# Patient Record
Sex: MALE | Race: WHITE | NOT HISPANIC OR LATINO | Employment: FULL TIME | URBAN - METROPOLITAN AREA
[De-identification: names, ages, dates, MRNs, and addresses within clinical notes are randomized per-mention and may not be internally consistent; named-entity substitution may affect disease eponyms.]

---

## 2017-01-17 ENCOUNTER — OFFICE VISIT - HEALTHEAST (OUTPATIENT)
Dept: FAMILY MEDICINE | Facility: CLINIC | Age: 37
End: 2017-01-17

## 2017-01-17 DIAGNOSIS — M25.561 RIGHT KNEE PAIN: ICD-10-CM

## 2017-01-17 DIAGNOSIS — M54.12 CERVICAL RADICULOPATHY AT C6: ICD-10-CM

## 2017-01-20 ENCOUNTER — OFFICE VISIT - HEALTHEAST (OUTPATIENT)
Dept: PHYSICAL THERAPY | Facility: REHABILITATION | Age: 37
End: 2017-01-20

## 2017-01-20 DIAGNOSIS — M47.812 CERVICAL FACET SYNDROME: ICD-10-CM

## 2017-01-20 DIAGNOSIS — M54.12 CERVICAL RADICULOPATHY: ICD-10-CM

## 2017-04-05 ENCOUNTER — OFFICE VISIT - HEALTHEAST (OUTPATIENT)
Dept: FAMILY MEDICINE | Facility: CLINIC | Age: 37
End: 2017-04-05

## 2017-04-05 DIAGNOSIS — M25.472 ANKLE SWELLING, LEFT: ICD-10-CM

## 2018-04-06 ENCOUNTER — RECORDS - HEALTHEAST (OUTPATIENT)
Dept: LAB | Facility: CLINIC | Age: 38
End: 2018-04-06

## 2018-04-06 LAB
ANION GAP SERPL CALCULATED.3IONS-SCNC: 11 MMOL/L (ref 5–18)
BUN SERPL-MCNC: 12 MG/DL (ref 8–22)
CALCIUM SERPL-MCNC: 10.1 MG/DL (ref 8.5–10.5)
CHLORIDE BLD-SCNC: 104 MMOL/L (ref 98–107)
CO2 SERPL-SCNC: 26 MMOL/L (ref 22–31)
CREAT SERPL-MCNC: 0.96 MG/DL (ref 0.7–1.3)
GFR SERPL CREATININE-BSD FRML MDRD: >60 ML/MIN/1.73M2
GLUCOSE BLD-MCNC: 81 MG/DL (ref 70–125)
POTASSIUM BLD-SCNC: 4.4 MMOL/L (ref 3.5–5)
SODIUM SERPL-SCNC: 141 MMOL/L (ref 136–145)
URATE SERPL-MCNC: 9.1 MG/DL (ref 3–8)

## 2018-08-22 ENCOUNTER — OFFICE VISIT - HEALTHEAST (OUTPATIENT)
Dept: FAMILY MEDICINE | Facility: CLINIC | Age: 38
End: 2018-08-22

## 2018-08-22 ENCOUNTER — COMMUNICATION - HEALTHEAST (OUTPATIENT)
Dept: FAMILY MEDICINE | Facility: CLINIC | Age: 38
End: 2018-08-22

## 2018-08-22 DIAGNOSIS — S60.512A HAND ABRASION, LEFT, INITIAL ENCOUNTER: ICD-10-CM

## 2018-08-22 ASSESSMENT — MIFFLIN-ST. JEOR: SCORE: 1733.19

## 2019-07-17 ENCOUNTER — OFFICE VISIT - HEALTHEAST (OUTPATIENT)
Dept: FAMILY MEDICINE | Facility: CLINIC | Age: 39
End: 2019-07-17

## 2019-07-17 DIAGNOSIS — M25.473 ANKLE SWELLING, UNSPECIFIED LATERALITY: ICD-10-CM

## 2019-07-17 DIAGNOSIS — L55.9 SUNBURN: ICD-10-CM

## 2019-07-17 ASSESSMENT — MIFFLIN-ST. JEOR: SCORE: 1726.84

## 2019-07-25 ENCOUNTER — COMMUNICATION - HEALTHEAST (OUTPATIENT)
Dept: SCHEDULING | Facility: CLINIC | Age: 39
End: 2019-07-25

## 2019-07-25 ENCOUNTER — OFFICE VISIT - HEALTHEAST (OUTPATIENT)
Dept: FAMILY MEDICINE | Facility: CLINIC | Age: 39
End: 2019-07-25

## 2019-07-25 DIAGNOSIS — R21 RASH: ICD-10-CM

## 2019-11-03 ENCOUNTER — HEALTH MAINTENANCE LETTER (OUTPATIENT)
Age: 39
End: 2019-11-03

## 2020-04-14 ENCOUNTER — COMMUNICATION - HEALTHEAST (OUTPATIENT)
Dept: FAMILY MEDICINE | Facility: CLINIC | Age: 40
End: 2020-04-14

## 2020-04-14 ENCOUNTER — OFFICE VISIT - HEALTHEAST (OUTPATIENT)
Dept: FAMILY MEDICINE | Facility: CLINIC | Age: 40
End: 2020-04-14

## 2020-04-14 DIAGNOSIS — Z71.1 WORRIED WELL: ICD-10-CM

## 2020-11-16 ENCOUNTER — HEALTH MAINTENANCE LETTER (OUTPATIENT)
Age: 40
End: 2020-11-16

## 2021-01-25 ENCOUNTER — OFFICE VISIT - HEALTHEAST (OUTPATIENT)
Dept: FAMILY MEDICINE | Facility: CLINIC | Age: 41
End: 2021-01-25

## 2021-01-25 DIAGNOSIS — Z83.3 FAMILY HISTORY OF DIABETES MELLITUS: ICD-10-CM

## 2021-01-25 DIAGNOSIS — M10.071 ACUTE IDIOPATHIC GOUT OF RIGHT FOOT: ICD-10-CM

## 2021-01-25 LAB
ALBUMIN SERPL-MCNC: 4.6 G/DL (ref 3.5–5)
ALP SERPL-CCNC: 121 U/L (ref 45–120)
ALT SERPL W P-5'-P-CCNC: 48 U/L (ref 0–45)
ANION GAP SERPL CALCULATED.3IONS-SCNC: 12 MMOL/L (ref 5–18)
AST SERPL W P-5'-P-CCNC: 27 U/L (ref 0–40)
BASOPHILS # BLD AUTO: 0 THOU/UL (ref 0–0.2)
BASOPHILS NFR BLD AUTO: 0 % (ref 0–2)
BILIRUB SERPL-MCNC: 0.6 MG/DL (ref 0–1)
BUN SERPL-MCNC: 15 MG/DL (ref 8–22)
CALCIUM SERPL-MCNC: 9.9 MG/DL (ref 8.5–10.5)
CHLORIDE BLD-SCNC: 103 MMOL/L (ref 98–107)
CHOLEST SERPL-MCNC: 225 MG/DL
CO2 SERPL-SCNC: 23 MMOL/L (ref 22–31)
CREAT SERPL-MCNC: 1.08 MG/DL (ref 0.7–1.3)
EOSINOPHIL # BLD AUTO: 0.3 THOU/UL (ref 0–0.4)
EOSINOPHIL NFR BLD AUTO: 2 % (ref 0–6)
ERYTHROCYTE [DISTWIDTH] IN BLOOD BY AUTOMATED COUNT: 12.4 % (ref 11–14.5)
FASTING STATUS PATIENT QL REPORTED: NO
GFR SERPL CREATININE-BSD FRML MDRD: >60 ML/MIN/1.73M2
GLUCOSE BLD-MCNC: 80 MG/DL (ref 70–125)
HBA1C MFR BLD: 5 %
HCT VFR BLD AUTO: 53.7 % (ref 40–54)
HDLC SERPL-MCNC: 27 MG/DL
HGB BLD-MCNC: 19.3 G/DL (ref 14–18)
IMM GRANULOCYTES # BLD: 0.1 THOU/UL
IMM GRANULOCYTES NFR BLD: 1 %
LDLC SERPL CALC-MCNC: 97 MG/DL
LDLC SERPL CALC-MCNC: ABNORMAL MG/DL
LYMPHOCYTES # BLD AUTO: 2 THOU/UL (ref 0.8–4.4)
LYMPHOCYTES NFR BLD AUTO: 16 % (ref 20–40)
MCH RBC QN AUTO: 30.8 PG (ref 27–34)
MCHC RBC AUTO-ENTMCNC: 35.9 G/DL (ref 32–36)
MCV RBC AUTO: 86 FL (ref 80–100)
MONOCYTES # BLD AUTO: 1 THOU/UL (ref 0–0.9)
MONOCYTES NFR BLD AUTO: 8 % (ref 2–10)
NEUTROPHILS # BLD AUTO: 9.3 THOU/UL (ref 2–7.7)
NEUTROPHILS NFR BLD AUTO: 73 % (ref 50–70)
PLATELET # BLD AUTO: 306 THOU/UL (ref 140–440)
PMV BLD AUTO: 9.7 FL (ref 8.5–12.5)
POTASSIUM BLD-SCNC: 4.6 MMOL/L (ref 3.5–5)
PROT SERPL-MCNC: 8.1 G/DL (ref 6–8)
RBC # BLD AUTO: 6.26 MILL/UL (ref 4.4–6.2)
SODIUM SERPL-SCNC: 138 MMOL/L (ref 136–145)
TRIGL SERPL-MCNC: 1002 MG/DL
URATE SERPL-MCNC: 8.4 MG/DL (ref 3–8)
WBC: 12.7 THOU/UL (ref 4–11)

## 2021-01-25 ASSESSMENT — MIFFLIN-ST. JEOR: SCORE: 1717.09

## 2021-01-26 ENCOUNTER — COMMUNICATION - HEALTHEAST (OUTPATIENT)
Dept: FAMILY MEDICINE | Facility: CLINIC | Age: 41
End: 2021-01-26

## 2021-01-26 ENCOUNTER — AMBULATORY - HEALTHEAST (OUTPATIENT)
Dept: FAMILY MEDICINE | Facility: CLINIC | Age: 41
End: 2021-01-26

## 2021-01-26 ENCOUNTER — COMMUNICATION - HEALTHEAST (OUTPATIENT)
Dept: ADMINISTRATIVE | Facility: CLINIC | Age: 41
End: 2021-01-26

## 2021-01-26 DIAGNOSIS — M10.071 ACUTE IDIOPATHIC GOUT OF RIGHT FOOT: ICD-10-CM

## 2021-02-04 ENCOUNTER — OFFICE VISIT - HEALTHEAST (OUTPATIENT)
Dept: FAMILY MEDICINE | Facility: CLINIC | Age: 41
End: 2021-02-04

## 2021-02-04 DIAGNOSIS — M1A.0610 IDIOPATHIC CHRONIC GOUT OF RIGHT KNEE WITHOUT TOPHUS: ICD-10-CM

## 2021-02-04 DIAGNOSIS — M10.071 ACUTE IDIOPATHIC GOUT OF RIGHT FOOT: ICD-10-CM

## 2021-02-04 LAB
ERYTHROCYTE [SEDIMENTATION RATE] IN BLOOD BY WESTERGREN METHOD: 8 MM/HR (ref 0–15)
URATE SERPL-MCNC: 9.8 MG/DL (ref 3–8)

## 2021-02-05 ENCOUNTER — AMBULATORY - HEALTHEAST (OUTPATIENT)
Dept: FAMILY MEDICINE | Facility: CLINIC | Age: 41
End: 2021-02-05

## 2021-02-05 ENCOUNTER — COMMUNICATION - HEALTHEAST (OUTPATIENT)
Dept: FAMILY MEDICINE | Facility: CLINIC | Age: 41
End: 2021-02-05

## 2021-02-05 DIAGNOSIS — M1A.0610 IDIOPATHIC CHRONIC GOUT OF RIGHT KNEE WITHOUT TOPHUS: ICD-10-CM

## 2021-02-05 LAB
ANA SER QL: 0.1 U
B BURGDOR IGG+IGM SER QL: 0.05 INDEX VALUE

## 2021-03-15 ENCOUNTER — COMMUNICATION - HEALTHEAST (OUTPATIENT)
Dept: FAMILY MEDICINE | Facility: CLINIC | Age: 41
End: 2021-03-15

## 2021-03-15 ENCOUNTER — OFFICE VISIT - HEALTHEAST (OUTPATIENT)
Dept: FAMILY MEDICINE | Facility: CLINIC | Age: 41
End: 2021-03-15

## 2021-03-15 DIAGNOSIS — M1A.0610 IDIOPATHIC CHRONIC GOUT OF RIGHT KNEE WITHOUT TOPHUS: ICD-10-CM

## 2021-03-15 DIAGNOSIS — G89.29 CHRONIC PAIN OF RIGHT KNEE: ICD-10-CM

## 2021-03-15 DIAGNOSIS — M25.561 CHRONIC PAIN OF RIGHT KNEE: ICD-10-CM

## 2021-03-15 LAB
ALBUMIN SERPL-MCNC: 4.2 G/DL (ref 3.5–5)
ALP SERPL-CCNC: 91 U/L (ref 45–120)
ALT SERPL W P-5'-P-CCNC: 31 U/L (ref 0–45)
ANION GAP SERPL CALCULATED.3IONS-SCNC: 12 MMOL/L (ref 5–18)
AST SERPL W P-5'-P-CCNC: 20 U/L (ref 0–40)
BASOPHILS # BLD AUTO: 0 THOU/UL (ref 0–0.2)
BASOPHILS NFR BLD AUTO: 1 % (ref 0–2)
BILIRUB SERPL-MCNC: 0.4 MG/DL (ref 0–1)
BUN SERPL-MCNC: 13 MG/DL (ref 8–22)
CALCIUM SERPL-MCNC: 9.5 MG/DL (ref 8.5–10.5)
CHLORIDE BLD-SCNC: 110 MMOL/L (ref 98–107)
CO2 SERPL-SCNC: 24 MMOL/L (ref 22–31)
CREAT SERPL-MCNC: 1.01 MG/DL (ref 0.7–1.3)
EOSINOPHIL # BLD AUTO: 0.6 THOU/UL (ref 0–0.4)
EOSINOPHIL NFR BLD AUTO: 7 % (ref 0–6)
ERYTHROCYTE [DISTWIDTH] IN BLOOD BY AUTOMATED COUNT: 12.9 % (ref 11–14.5)
GFR SERPL CREATININE-BSD FRML MDRD: >60 ML/MIN/1.73M2
GLUCOSE BLD-MCNC: 95 MG/DL (ref 70–125)
HCT VFR BLD AUTO: 49.4 % (ref 40–54)
HGB BLD-MCNC: 17.7 G/DL (ref 14–18)
IMM GRANULOCYTES # BLD: 0 THOU/UL
IMM GRANULOCYTES NFR BLD: 0 %
LYMPHOCYTES # BLD AUTO: 1.8 THOU/UL (ref 0.8–4.4)
LYMPHOCYTES NFR BLD AUTO: 22 % (ref 20–40)
MCH RBC QN AUTO: 30.5 PG (ref 27–34)
MCHC RBC AUTO-ENTMCNC: 35.8 G/DL (ref 32–36)
MCV RBC AUTO: 85 FL (ref 80–100)
MONOCYTES # BLD AUTO: 0.7 THOU/UL (ref 0–0.9)
MONOCYTES NFR BLD AUTO: 8 % (ref 2–10)
NEUTROPHILS # BLD AUTO: 4.9 THOU/UL (ref 2–7.7)
NEUTROPHILS NFR BLD AUTO: 61 % (ref 50–70)
PLATELET # BLD AUTO: 267 THOU/UL (ref 140–440)
PMV BLD AUTO: 9.6 FL (ref 7–10)
POTASSIUM BLD-SCNC: 4.6 MMOL/L (ref 3.5–5)
PROT SERPL-MCNC: 7.2 G/DL (ref 6–8)
RBC # BLD AUTO: 5.8 MILL/UL (ref 4.4–6.2)
SODIUM SERPL-SCNC: 146 MMOL/L (ref 136–145)
URATE SERPL-MCNC: 5.4 MG/DL (ref 3–8)
WBC: 8 THOU/UL (ref 4–11)

## 2021-03-15 ASSESSMENT — PATIENT HEALTH QUESTIONNAIRE - PHQ9: SUM OF ALL RESPONSES TO PHQ QUESTIONS 1-9: 0

## 2021-04-20 ENCOUNTER — OFFICE VISIT (OUTPATIENT)
Dept: DERMATOLOGY | Facility: CLINIC | Age: 41
End: 2021-04-20
Payer: COMMERCIAL

## 2021-04-20 ENCOUNTER — TELEPHONE (OUTPATIENT)
Dept: DERMATOLOGY | Facility: CLINIC | Age: 41
End: 2021-04-20

## 2021-04-20 VITALS — HEART RATE: 96 BPM | SYSTOLIC BLOOD PRESSURE: 143 MMHG | OXYGEN SATURATION: 96 % | DIASTOLIC BLOOD PRESSURE: 102 MMHG

## 2021-04-20 DIAGNOSIS — L40.9 PSORIASIS OF NAIL: Primary | ICD-10-CM

## 2021-04-20 LAB
ALBUMIN SERPL-MCNC: 4.2 G/DL (ref 3.4–5)
ALP SERPL-CCNC: 96 U/L (ref 40–150)
ALT SERPL W P-5'-P-CCNC: 48 U/L (ref 0–70)
ANION GAP SERPL CALCULATED.3IONS-SCNC: 3 MMOL/L (ref 3–14)
AST SERPL W P-5'-P-CCNC: 28 U/L (ref 0–45)
BILIRUB SERPL-MCNC: 0.4 MG/DL (ref 0.2–1.3)
BUN SERPL-MCNC: 14 MG/DL (ref 7–30)
CALCIUM SERPL-MCNC: 9.4 MG/DL (ref 8.5–10.1)
CHLORIDE SERPL-SCNC: 108 MMOL/L (ref 94–109)
CO2 SERPL-SCNC: 29 MMOL/L (ref 20–32)
CREAT SERPL-MCNC: 1.09 MG/DL (ref 0.66–1.25)
ERYTHROCYTE [DISTWIDTH] IN BLOOD BY AUTOMATED COUNT: 13.8 % (ref 10–15)
GFR SERPL CREATININE-BSD FRML MDRD: 85 ML/MIN/{1.73_M2}
GLUCOSE SERPL-MCNC: 93 MG/DL (ref 70–99)
HCT VFR BLD AUTO: 53.5 % (ref 40–53)
HGB BLD-MCNC: 18.5 G/DL (ref 13.3–17.7)
MCH RBC QN AUTO: 30.8 PG (ref 26.5–33)
MCHC RBC AUTO-ENTMCNC: 34.6 G/DL (ref 31.5–36.5)
MCV RBC AUTO: 89 FL (ref 78–100)
PLATELET # BLD AUTO: 227 10E9/L (ref 150–450)
POTASSIUM SERPL-SCNC: 4.4 MMOL/L (ref 3.4–5.3)
PROT SERPL-MCNC: 7.9 G/DL (ref 6.8–8.8)
RBC # BLD AUTO: 6.01 10E12/L (ref 4.4–5.9)
SODIUM SERPL-SCNC: 140 MMOL/L (ref 133–144)
WBC # BLD AUTO: 7.8 10E9/L (ref 4–11)

## 2021-04-20 PROCEDURE — 99203 OFFICE O/P NEW LOW 30 MIN: CPT | Performed by: DERMATOLOGY

## 2021-04-20 PROCEDURE — 80053 COMPREHEN METABOLIC PANEL: CPT | Performed by: DERMATOLOGY

## 2021-04-20 PROCEDURE — 85027 COMPLETE CBC AUTOMATED: CPT | Performed by: DERMATOLOGY

## 2021-04-20 PROCEDURE — 86481 TB AG RESPONSE T-CELL SUSP: CPT | Performed by: DERMATOLOGY

## 2021-04-20 PROCEDURE — 86803 HEPATITIS C AB TEST: CPT | Performed by: DERMATOLOGY

## 2021-04-20 PROCEDURE — 86704 HEP B CORE ANTIBODY TOTAL: CPT | Performed by: DERMATOLOGY

## 2021-04-20 PROCEDURE — 36415 COLL VENOUS BLD VENIPUNCTURE: CPT | Performed by: DERMATOLOGY

## 2021-04-20 RX ORDER — ALLOPURINOL 300 MG/1
300 TABLET ORAL DAILY
COMMUNITY
Start: 2021-02-04 | End: 2022-03-03

## 2021-04-20 NOTE — PROGRESS NOTES
Martell Clement is an extremely pleasant 41 year old year old male patient here today for nail psoriasis.   .   Patient states this has been present for a while.  Patient reports the following symptoms:  Nail changes and finger pain.  Dad has psoriasis.  .  Patient reports the following previous treatments NBVUB helped some.  Patient reports the following modifying factors none.  Associated symptoms: none.  Patient has no other skin complaints today.  Remainder of the HPI, Meds, PMH, Allergies, FH, and SH was reviewed in chart.    No past medical history on file.    No past surgical history on file.     Family History   Problem Relation Age of Onset     Diabetes Father      Heart Disease Father      Diabetes Paternal Grandmother      Diabetes Paternal Grandfather        Social History     Socioeconomic History     Marital status: Single     Spouse name: Not on file     Number of children: Not on file     Years of education: Not on file     Highest education level: Not on file   Occupational History     Not on file   Social Needs     Financial resource strain: Not on file     Food insecurity     Worry: Not on file     Inability: Not on file     Transportation needs     Medical: Not on file     Non-medical: Not on file   Tobacco Use     Smoking status: Current Every Day Smoker     Smokeless tobacco: Never Used   Substance and Sexual Activity     Alcohol use: No     Drug use: No     Sexual activity: Not Currently     Partners: Female   Lifestyle     Physical activity     Days per week: Not on file     Minutes per session: Not on file     Stress: Not on file   Relationships     Social connections     Talks on phone: Not on file     Gets together: Not on file     Attends [employer] service: Not on file     Active member of club or organization: Not on file     Attends meetings of clubs or organizations: Not on file     Relationship status: Not on file     Intimate partner violence     Fear of current or ex partner: Not on  file     Emotionally abused: Not on file     Physically abused: Not on file     Forced sexual activity: Not on file   Other Topics Concern     Parent/sibling w/ CABG, MI or angioplasty before 65F 55M? Yes     Comment: father late 40's - stent    Social History Narrative     Not on file       Outpatient Encounter Medications as of 4/20/2021   Medication Sig Dispense Refill     allopurinol (ZYLOPRIM) 300 MG tablet Take 300 mg by mouth daily       cholecalciferol 25 MCG (1000 UT) TABS Take 1,000 Units by mouth daily       Omega-3 Fatty Acids (FISH OIL PO)        No facility-administered encounter medications on file as of 4/20/2021.              Review Of Systems  Skin: As above  Eyes: negative  Ears/Nose/Throat: negative  Respiratory: No shortness of breath, dyspnea on exertion, cough, or hemoptysis  Cardiovascular: negative  Gastrointestinal: negative  Genitourinary: negative  Musculoskeletal: negative  Neurologic: negative  Psychiatric: negative  Hematologic/Lymphatic/Immunologic: negative  Endocrine: negative      O:   NAD, WDWN, Alert & Oriented, Mood & Affect wnl, Vitals stable   Here today alone   BP (!) 143/102 (BP Location: Left arm, Patient Position: Sitting, Cuff Size: Adult Large)   Pulse 96   SpO2 96%    General appearance normal   Vitals stable   Alert, oriented and in no acute distress     Pitting and onhcolysis of all fingernails     The remainder of expanded problem focused exam was normal; the following areas were examined:  scalp/hair, conjunctiva/lids, face, neck,       Eyes: Conjunctivae/lids:Normal     ENT: Lips, buccal mucosa, tongue: normal    MSK:Normal    Cardiovascular: peripheral edema none    Pulm: Breathing Normal    Neuro/Psych: Orientation:Alert and Orientedx3 ; Mood/Affect:normal       A/P:  1. Nail psoriasis  methotrexaten and biolgics discussed with patient   He will think about  Check cbc, cmp, tb, hep b and c and he will mychart us on whathe wants to do  It was a pleasure speaking  to Martell Clement today.  Previous clinic  notes and pertinent laboratory tests were reviewed prior to Martell Clement's visit.

## 2021-04-20 NOTE — LETTER
4/20/2021         RE: Martell Clement  [address] [address] [facility] [address]        Dear Colleague,    Thank you for referring your patient, Martell Clement, to the [facility]. Please see a copy of my visit note below.    Martell Clement is an extremely pleasant 41 year old year old male patient here today for nail psoriasis.   .   Patient states this has been present for a while.  Patient reports the following symptoms:  Nail changes and finger pain.  Dad has psoriasis.  .  Patient reports the following previous treatments NBVUB helped some.  Patient reports the following modifying factors none.  Associated symptoms: none.  Patient has no other skin complaints today.  Remainder of the HPI, Meds, PMH, Allergies, FH, and SH was reviewed in chart.    No past medical history on file.    No past surgical history on file.     Family History   Problem Relation Age of Onset     Diabetes Father      Heart Disease Father      Diabetes Paternal Grandmother      Diabetes Paternal Grandfather        Social History     Socioeconomic History     Marital status: Single     Spouse name: Not on file     Number of children: Not on file     Years of education: Not on file     Highest education level: Not on file   Occupational History     Not on file   Social Needs     Financial resource strain: Not on file     Food insecurity     Worry: Not on file     Inability: Not on file     Transportation needs     Medical: Not on file     Non-medical: Not on file   Tobacco Use     Smoking status: Current Every Day Smoker     Smokeless tobacco: Never Used   Substance and Sexual Activity     Alcohol use: No     Drug use: No     Sexual activity: Not Currently     Partners: Female   Lifestyle     Physical activity     Days per week: Not on file     Minutes per session: Not on file     Stress: Not on file   Relationships     Social connections     Talks on phone: Not on file     Gets together: Not on file     Attends  [employer] service: Not on file     Active member of club or organization: Not on file     Attends meetings of clubs or organizations: Not on file     Relationship status: Not on file     Intimate partner violence     Fear of current or ex partner: Not on file     Emotionally abused: Not on file     Physically abused: Not on file     Forced sexual activity: Not on file   Other Topics Concern     Parent/sibling w/ CABG, MI or angioplasty before 65F 55M? Yes     Comment: father late 40's - stent    Social History Narrative     Not on file       Outpatient Encounter Medications as of 4/20/2021   Medication Sig Dispense Refill     allopurinol (ZYLOPRIM) 300 MG tablet Take 300 mg by mouth daily       cholecalciferol 25 MCG (1000 UT) TABS Take 1,000 Units by mouth daily       Omega-3 Fatty Acids (FISH OIL PO)        No facility-administered encounter medications on file as of 4/20/2021.              Review Of Systems  Skin: As above  Eyes: negative  Ears/Nose/Throat: negative  Respiratory: No shortness of breath, dyspnea on exertion, cough, or hemoptysis  Cardiovascular: negative  Gastrointestinal: negative  Genitourinary: negative  Musculoskeletal: negative  Neurologic: negative  Psychiatric: negative  Hematologic/Lymphatic/Immunologic: negative  Endocrine: negative      O:   NAD, WDWN, Alert & Oriented, Mood & Affect wnl, Vitals stable   Here today alone   BP (!) 143/102 (BP Location: Left arm, Patient Position: Sitting, Cuff Size: Adult Large)   Pulse 96   SpO2 96%    General appearance normal   Vitals stable   Alert, oriented and in no acute distress     Pitting and onhcolysis of all fingernails     The remainder of expanded problem focused exam was normal; the following areas were examined:  scalp/hair, conjunctiva/lids, face, neck,       Eyes: Conjunctivae/lids:Normal     ENT: Lips, buccal mucosa, tongue: normal    MSK:Normal    Cardiovascular: peripheral edema none    Pulm: Breathing Normal    Neuro/Psych:  Orientation:Alert and Orientedx3 ; Mood/Affect:normal       A/P:  1. Nail psoriasis  methotrexaten and biolgics discussed with patient   He will think about  Check cbc, cmp, tb, hep b and c and he will mychart us on whathe wants to do  It was a pleasure speaking to Martell Clement today.  Previous clinic  notes and pertinent laboratory tests were reviewed prior to Martell Clement's visit.        Again, thank you for allowing me to participate in the care of your patient.        Sincerely,        Mikhail Steele MD

## 2021-04-20 NOTE — TELEPHONE ENCOUNTER
Reason for Call:  Other call back    Detailed comments: Pt states he had labs drawn at a different clinic recently -wants to make sure that he is not going to be duplicating labs.    Phone Number Patient can be reached at: Home number on file [phone] (home)    Best Time:     Can we leave a detailed message on this number? YES    Call taken on 4/20/2021 at 3:47 PM by Hortensia Lomeli

## 2021-04-20 NOTE — TELEPHONE ENCOUNTER
Spoke to patient who had the following drawn:     CBC and diff  CMP  Erthy Sed rate  KELLY  Lyme  Uric acid  HGb A1 c  Lipids/LDL  Troponin  D-Dimer  Lipase  CBC and ECG 12 lead    I did advise that the CBC and CMP were duplicates but since the blood has already been drawn so it is likely that too late, and he verbalized understanding.     I did advise in the future, he could certainly have drawing lab fax results to us and then he would not need to duplicate lab draw. Patient verbalized understanding. Latrice Saini RN

## 2021-04-20 NOTE — NURSING NOTE
Chief Complaint   Patient presents with     Eczema     nail       Vitals:    04/20/21 1436   BP: (!) 143/102   BP Location: Left arm   Patient Position: Sitting   Cuff Size: Adult Large   Pulse: 96   SpO2: 96%     Wt Readings from Last 1 Encounters:   11/22/13 83.9 kg (185 lb)       Juani Becker LPN .................4/20/2021

## 2021-04-21 LAB
HBV CORE AB SERPL QL IA: NONREACTIVE
HCV AB SERPL QL IA: NONREACTIVE

## 2021-04-22 LAB
GAMMA INTERFERON BACKGROUND BLD IA-ACNC: 0.02 IU/ML
M TB IFN-G CD4+ BCKGRND COR BLD-ACNC: 9.98 IU/ML
M TB TUBERC IFN-G BLD QL: NEGATIVE
MITOGEN IGNF BCKGRD COR BLD-ACNC: 0 IU/ML
MITOGEN IGNF BCKGRD COR BLD-ACNC: 0 IU/ML

## 2021-05-14 ENCOUNTER — COMMUNICATION - HEALTHEAST (OUTPATIENT)
Dept: ADMINISTRATIVE | Facility: CLINIC | Age: 41
End: 2021-05-14

## 2021-05-14 DIAGNOSIS — M1A.0610 IDIOPATHIC CHRONIC GOUT OF RIGHT KNEE WITHOUT TOPHUS: ICD-10-CM

## 2021-05-17 ENCOUNTER — OFFICE VISIT - HEALTHEAST (OUTPATIENT)
Dept: FAMILY MEDICINE | Facility: CLINIC | Age: 41
End: 2021-05-17

## 2021-05-17 DIAGNOSIS — M1A.0610 IDIOPATHIC CHRONIC GOUT OF RIGHT KNEE WITHOUT TOPHUS: ICD-10-CM

## 2021-05-17 LAB
ALBUMIN SERPL-MCNC: 4.6 G/DL (ref 3.5–5)
ALP SERPL-CCNC: 93 U/L (ref 45–120)
ALT SERPL W P-5'-P-CCNC: 47 U/L (ref 0–45)
ANION GAP SERPL CALCULATED.3IONS-SCNC: 15 MMOL/L (ref 5–18)
AST SERPL W P-5'-P-CCNC: 32 U/L (ref 0–40)
BILIRUB SERPL-MCNC: 0.6 MG/DL (ref 0–1)
BUN SERPL-MCNC: 18 MG/DL (ref 8–22)
CALCIUM SERPL-MCNC: 10.2 MG/DL (ref 8.5–10.5)
CHLORIDE BLD-SCNC: 104 MMOL/L (ref 98–107)
CO2 SERPL-SCNC: 24 MMOL/L (ref 22–31)
CREAT SERPL-MCNC: 1.2 MG/DL (ref 0.7–1.3)
GFR SERPL CREATININE-BSD FRML MDRD: >60 ML/MIN/1.73M2
GLUCOSE BLD-MCNC: 88 MG/DL (ref 70–125)
POTASSIUM BLD-SCNC: 5.1 MMOL/L (ref 3.5–5)
PROT SERPL-MCNC: 7.5 G/DL (ref 6–8)
SODIUM SERPL-SCNC: 143 MMOL/L (ref 136–145)
URATE SERPL-MCNC: 5.7 MG/DL (ref 3–8)

## 2021-05-19 ENCOUNTER — RECORDS - HEALTHEAST (OUTPATIENT)
Dept: ADMINISTRATIVE | Facility: OTHER | Age: 41
End: 2021-05-19

## 2021-05-27 VITALS
DIASTOLIC BLOOD PRESSURE: 88 MMHG | OXYGEN SATURATION: 97 % | WEIGHT: 193.8 LBS | BODY MASS INDEX: 30.13 KG/M2 | SYSTOLIC BLOOD PRESSURE: 118 MMHG | HEART RATE: 87 BPM

## 2021-05-27 ASSESSMENT — PATIENT HEALTH QUESTIONNAIRE - PHQ9: SUM OF ALL RESPONSES TO PHQ QUESTIONS 1-9: 0

## 2021-05-30 VITALS — BODY MASS INDEX: 27.52 KG/M2 | WEIGHT: 181 LBS

## 2021-05-30 VITALS — BODY MASS INDEX: 28.06 KG/M2 | WEIGHT: 184.56 LBS

## 2021-05-30 NOTE — PROGRESS NOTES
"  Chief Complaint   Patient presents with     Sunburn         HPI:   Martell Clement is a 39 y.o. male c/o sunburn on legs 2019.  Developed blisters on the area about a week later.  Yesterday had swelling in ankles by the end of the day, but much improved today.  He is wondering if this swelling might be from his gout.  Area is itchy.  No fevers.        ROS:  A 10 point comprehensive review of systems was negative except as noted.     Medications:  Current Outpatient Medications on File Prior to Visit   Medication Sig Dispense Refill     indomethacin (INDOCIN) 50 MG capsule Take 50 mg by mouth 2 (two) times a day with meals.       cholecalciferol, vitamin D3, (VITAMIN D3) 1,000 unit capsule Take 1,000 Units by mouth daily.       multivitamin capsule Take 1 capsule by mouth daily.       OMEGA-3/DHA/EPA/FISH OIL (FISH OIL-OMEGA-3 FATTY ACIDS) 300-1,000 mg capsule Take 2 g by mouth daily.       omeprazole (PRILOSEC) 20 MG capsule Take 1 capsule (20 mg total) by mouth daily before breakfast. 30 capsule 0     vitamin E 100 UNIT capsule Take 100 Units by mouth daily.       No current facility-administered medications on file prior to visit.          Social History:  Social History     Tobacco Use     Smoking status: Current Every Day Smoker     Last attempt to quit: 2016     Years since quittin.5     Smokeless tobacco: Never Used     Tobacco comment: used to smoke 1/2ppd   Substance Use Topics     Alcohol use: No     Comment: used to be heavy drinker and has stopped         Physical Exam:   Vitals:    19 1412   BP: 114/72   Patient Site: Right Arm   Patient Position: Sitting   Cuff Size: Adult Regular   Pulse: (!) 104   Resp: 20   Temp: 99.2  F (37.3  C)   TempSrc: Oral   Weight: 184 lb 9.6 oz (83.7 kg)   Height: 5' 8\" (1.727 m)       GEN: NAD  LEGS: bilateral erythema over lower legs with peeling noted   ANKLES:  Slight swelling        Assessment/Plan:    1. Sunburn     2. Ankle swelling, unspecified " laterality       Healing sunburn.  Swelling in ankles not gout, but likely due to the inflammation from the sunburn.  Advised good moisturizing of sunburned area.  Elevate legs frequently during the day.    Discussed gout--symptoms that he is having now are not gout.  He doesn't know that he has ever had joint aspiration with confirmation of gout, just appears to have been diagnosed clinically.  He is currently taking indomethacin for gout.  Originally it sounded to me like he was taking it daily to prevent gout flare ups, but he states he has taken it more frequently, although not daily, in the last month because he has had more frequent pain in his ankles that he thinks is gout pain.  Chart review shows an elevated uric acid.  He states he doesn't want to be on allopurinol because he doesn't want to take medication daily.  Follow up with his primary to further evaluate.          Elton Bellamy MD      7/17/2019    The following portions of the patient's history were reviewed and updated as appropriate: allergies, current medications, past family history, past medical history, past social history, past surgical history and problem list.

## 2021-05-30 NOTE — PROGRESS NOTES
Chief Complaint   Patient presents with     poss sunburn     [id number] Sunburn on the left/ right legs very painful possble be infection        HPI:  Martell Clement is a 39 y.o. male with past medical history of gout who presents today complaining of concern for possible infection from a sunburn that occurred 3 weeks ago.  Today patient developed purplish rash on his shins bilaterally.  He is otherwise feeling well and healthy without any fevers, chills, or pain in the legs.  He has a history of gout and had some lower extremity inflammation around his ankles earlier, so he was elevating his legs very frequently to decrease the fluid on his legs and taking indomethacin.    History obtained from the patient.    Problem List:  2015: Gout  2013: Compulsive tobacco user syndrome      Past Medical History:   Diagnosis Date     Gout        Social History     Tobacco Use     Smoking status: Current Every Day Smoker     Last attempt to quit: 2016     Years since quittin.5     Smokeless tobacco: Current User     Tobacco comment: used to smoke 1/2ppd   Substance Use Topics     Alcohol use: No     Comment: used to be heavy drinker and has stopped       Review of Systems   Constitutional: Negative for chills and fever.   Musculoskeletal: Negative for arthralgias.   Skin: Positive for rash.       Vitals:    19 1526 19 1528   BP: (!) 133/93 133/82   Pulse: (!) 116    Resp: 18    Temp: 98  F (36.7  C)    TempSrc: Oral    SpO2: 98%    Weight: 181 lb (82.1 kg)        Physical Exam   Constitutional: He appears well-developed and well-nourished. No distress.   HENT:   Head: Normocephalic and atraumatic.   Right Ear: External ear normal.   Left Ear: External ear normal.   Eyes: Conjunctivae are normal. Right eye exhibits no discharge. Left eye exhibits no discharge.   Cardiovascular: Normal rate, regular rhythm and normal heart sounds.   Pulmonary/Chest: Effort normal and breath sounds normal. No respiratory  distress.   Skin: He is not diaphoretic.   Dusky purplish blanchable rash present on shins bilaterally.  Patient states that this exact location is the area that he had a sunburn 3 weeks ago.  The skin has peeled since the sunburn occurred.  It is nontender to palpation and not warm to the touch.   Psychiatric: He has a normal mood and affect. His behavior is normal. Judgment and thought content normal.       Clinical Decision Making:  Shin rash is not consistent with purpura as it is blanching.  It is also not consistent with infection as it is not warm to the touch or tender to palpation.  Patient is otherwise feeling well and healthy.  Recommend conservative monitoring, and follow-up if symptoms are not improving over the course the next 1 week.  Patient is agreeable to this plan.  At the end of the encounter, I discussed results, diagnosis, medications. Discussed red flags for immediate return to clinic/ER, as well as indications for follow up if no improvement. Patient understood and agreed to plan. Patient was stable for discharge.    1. Rash           Patient Instructions   1.  Follow-up in 1 week if you are not having improvement in the rash on the legs.  2.  Follow up sooner if you develop warmth to the touch over the rash, fever or chills, increased pain in the legs, swelling of the legs, rash that is no longer blanching.

## 2021-05-30 NOTE — PATIENT INSTRUCTIONS - HE
1.  Follow-up in 1 week if you are not having improvement in the rash on the legs.  2.  Follow up sooner if you develop warmth to the touch over the rash, fever or chills, increased pain in the legs, swelling of the legs, rash that is no longer blanching.

## 2021-05-30 NOTE — TELEPHONE ENCOUNTER
"Patient calling stating \"I work at [employer] which is across the parking lot from [place] location, I suffered some severe sunburn on the 8th, it's been a weird trip for me but as of now I've shown a couple people on my legs at work and they are telling me they look infected.\"    \"Spreading redness, visually it does look infected but visually I've had about 4 different coworkers tell me that I should go in\"    See assessment below    Per protocol patient to be seen in the office today due to suspected infection. Patient agreeable with the plan and will be going to [facility], address provided. Care advice given.      Reason for Disposition    Looks infected (e.g., spreading redness, pus, red streak)    Answer Assessment - Initial Assessment Questions  1. APPEARANCE of SKIN: \"What does it look like?\" \"Where is the sunburn located?\"       \"Both of my legs, it looks purple\"  2. BLISTERS: \"Are there any blisters?\" If so, ask: \"Where are they located?\" \"How big are they?\" \"Are they closed or broken?\"       \"No blisters\"  3. EXTENT: \"How much of the body has a sunburn?\" \"How large is the area of blistering?\" (can compare to the size of their palm)      \"The entire front sides of them, the backs are white, the fronts are purple\"  4. ONSET: \"When did the sun exposure occur?\" (Hours or days ago)       On the 8th of July  5. PAIN: \"How painful is the sunburn?\"  (Scale 1-10; mild, moderate or severe)      \"I'd say maybe 2\"  6. OTHER SYMPTOMS: \"Do you have any other symptoms?\" (e.g., lightheadedness, nausea)      \"swelling, water retention in both legs, when I take my socks off my ankles are indented from my socks\"  7. PREGNANCY: \"Is there any chance you are pregnant?\" \"When was your last menstrual period?\"      N/a    Protocols used: SUNBURN-A-[place]      "

## 2021-06-01 VITALS — HEIGHT: 68 IN | BODY MASS INDEX: 28.19 KG/M2 | WEIGHT: 186 LBS

## 2021-06-02 ENCOUNTER — RECORDS - HEALTHEAST (OUTPATIENT)
Dept: ADMINISTRATIVE | Facility: CLINIC | Age: 41
End: 2021-06-02

## 2021-06-03 VITALS — HEIGHT: 68 IN | BODY MASS INDEX: 27.98 KG/M2 | WEIGHT: 184.6 LBS

## 2021-06-03 VITALS — BODY MASS INDEX: 27.52 KG/M2 | WEIGHT: 181 LBS

## 2021-06-05 VITALS
DIASTOLIC BLOOD PRESSURE: 90 MMHG | BODY MASS INDEX: 29.26 KG/M2 | HEART RATE: 84 BPM | SYSTOLIC BLOOD PRESSURE: 132 MMHG | WEIGHT: 189.6 LBS

## 2021-06-05 VITALS
BODY MASS INDEX: 27.92 KG/M2 | TEMPERATURE: 98 F | OXYGEN SATURATION: 96 % | SYSTOLIC BLOOD PRESSURE: 142 MMHG | WEIGHT: 184.2 LBS | HEART RATE: 117 BPM | HEIGHT: 68 IN | DIASTOLIC BLOOD PRESSURE: 97 MMHG

## 2021-06-05 VITALS
SYSTOLIC BLOOD PRESSURE: 118 MMHG | DIASTOLIC BLOOD PRESSURE: 88 MMHG | WEIGHT: 183.1 LBS | HEART RATE: 94 BPM | OXYGEN SATURATION: 97 % | BODY MASS INDEX: 28.25 KG/M2

## 2021-06-07 NOTE — TELEPHONE ENCOUNTER
Question following Office Visit  When did you see your provider: Today, telephone visit  What is your question: Dr. Barth was going to be checking into something and calling him back.  He hasn't heard anything yet.    Okay to leave a detailed message: Yes

## 2021-06-07 NOTE — PROGRESS NOTES
"Martell Clement is a 40 y.o. male who is being evaluated via a billable telephone visit.      The patient has been notified of following:     \"This telephone visit will be conducted via a call between you and your physician/provider. We have found that certain health care needs can be provided without the need for a physical exam.  This service lets us provide the care you need with a short phone conversation.  If a prescription is necessary we can send it directly to your pharmacy.  If lab work is needed we can place an order for that and you can then stop by our lab to have the test done at a later time.    Telephone visits are billed at different rates depending on your insurance coverage. During this emergency period, for some insurers they may be billed the same as an in-person visit.  Please reach out to your insurance provider with any questions.    If during the course of the call the physician/provider feels a telephone visit is not appropriate, you will not be charged for this service.\"    Patient has given verbal consent to a Telephone visit? Yes    Patient would like to receive their AVS by AVS Preference: Cheri.    Additional provider notes: Martell is a 40-year-old gentleman requesting an appointment virtually today to discuss the request for a note.  The patient had been working at [employer] as a .  He states that he was concerned about janet coronavirus while at work.  He did talk with his employer regarding personal protective equipment and masks and was told that it was unnecessary and that they would not be providing that.  The patient states that he lives with his elderly mom who is at increased risk for complications and possible death from coronavirus.  He struggled with this fact quite a bit and ultimately made the decision to turn in his resignation rather than bring coronavirus home to his mom.  He is requesting a note that states that his decision was based on the current " coronavirus pandemic and his mom's relative risk of infection.    Assessment/Plan:  1. Worried well  The patient was not requesting a note as it relates to his work but rather a note that could potentially help him obtain unemployment and relating his current employment status with the coronavirus pandemic.  I can understand his rationale and concerns although medically cannot advise that the decision he made was the right one or not.  Certainly, and this time of significant risk to the older population I can understand his dilemma and wish to support him with his decision by at least providing him with a letter that connects his decision to the pandemic.  The patient was grateful for this and the letter was sent.        Phone call duration:  11 minutes    Jesusita Barth MD

## 2021-06-08 NOTE — PROGRESS NOTES
[facility] Discharge Summary  Patient Name: Martell Clement  Date: 2/28/2017  Referral Diagnosis: [unfilled]  Referring provider: Ksenia Pro MD  Visit Diagnosis:   1. Cervical facet syndrome     2. Cervical radiculopathy         Goals:  Pt. will demonstrate/verbalize independence in self-management of condition in : 2 weeks  Pt. will be independent with home exercise program in : 2 weeks  Pt will: demonstrate sidebending equal triston with no increase in posterior shoulder pain in 2 weeks  Pt will: demonstrate reaching and lifting with appropriate shoulder biomechanics and no pain in 4 weeks    Patient was seen for 1 visit.  The patient discontinued therapy, did not return.    Therapy will be discontinued at this time.  The patient will need a new referral to resume.    Thank you for your referral.  Kendra Huntley, OCTAVIO  2/28/2017  1:43 PM      [facility]   Cervico-Thoracic Initial Evaluation    Patient Name: Martell Clement  Date of evaluation: 1/20/2017  Referral Diagnosis: Cervical radiculopathy at C6 [M54.12]  - Primary   Referring provider: Ksenia Pro MD  Visit Diagnosis:     ICD-10-CM    1. Cervical facet syndrome M12.88    2. Cervical radiculopathy M54.12        Assessment:      Martell Clement is a 36 y.o. male with PMH significant for gout who presents to therapy today with chief complaints of left posterior shoulder pain and index finger numbness.  Pt sx began about 2 months ago.  Upon musculoskeletal exam pt demonstrates signs and sx of carpal tunnel compression L hand and left c6 facet joint syndrome referring pain to his left posterior shoulder.  Pt is most functionally limited with looking up, sidebending left, reaching with his arm. He also demonstrates some hypermobility in his shoulders which contribute to significant use of shoulder elevation when reaching/lifting.  Of notable social info: he was working out at a gym but discontinued due to pain, and he is sleeping  on the couch at his father's, admits to recent stress and starting a new job all of which are most likely perpetuating his pain.  Posture contributing as well.  Pt would benefit from skilled PT to normalize facet joint mechanics in the cervical spine, improve neurodynamics LUE, education on shoulder mechanics and periscapular strengthening.        Goals:  Pt. will demonstrate/verbalize independence in self-management of condition in : 2 weeks  Pt. will be independent with home exercise program in : 2 weeks  Pt will: demonstrate sidebending equal triston with no increase in posterior shoulder pain in 2 weeks  Pt will: demonstrate reaching and lifting with appropriate shoulder biomechanics and no pain in 4 weeks    Patient's expectations/goals are realistic.    Barriers to Learning or Achieving Goals:  No Barriers.        Plan / Patient Instructions:        Plan of Care:   Communication with: Referral Source  Patient Related Instruction: Nature of Condition;Body mechanics;Treatment plan and rationale;Posture;Self Care instruction;Basis of treatment  Times per Week: 2  Number of Weeks: 4  Number of Visits: 8  Discharge Planning: DC with HEP when goals are met or pt has reached a plateau  Therapeutic Exercise: ROM;Stretching;Strengthening  Neuromuscular Reeducation: posture;core;kinesio tape  Manual Therapy: soft tissue mobilization;myofascial release;joint mobilization;muscle energy  Modalities: other  Modalities: PRN    Plan for next visit: cervical mobilizations     Subjective:         Social information:   Living Situation: lives with father in [place]   Occupation: Unemployed   Work Status: NA    History of Present Illness:    Martell is a 36 y.o. male who presents to therapy today with chief complaints of left sided shoulder pain and numbness into his 2nd digit L hand. Occasional weakness when reaching across body, some associated pain in posterior left shoulder.  Date of onset/duration of symptoms is about two months ago.  One month ago he started having left index finger numbness.  Onset was gradual, he did start up at the gym again recently.    Symptoms are constant and getting worse. Pain is at its worst with poor posture. He reports an episodic history of similar symptoms, but does not recall exactly, on his right side. He describes their previous level of function as not limited that he knows of, but he has quit going to the gym at this time.  Weight lift/gym.    Pt has a history of gout.  Pt goal is fixing his shoulder.    Pain Ratin currently, numb finger    Pain description: dull, achy in shoulder.  Hot/tingly in posterior forearm.    Functional limitations are described as occurring with:   Looking up, reaching    Patient reports benefit from nothing at this point.       Objective:      Note: Items left blank indicates the item was not performed or not indicated at the time of the evaluation.    Patient Outcome Measures :    Neck Disability Score in %: 13   Scores range from 0-100%, where a score of 0% represents minimal pain and maximal function. The minmal clinically important difference is a score reduction of 10%.    Examination  1. Cervical facet syndrome     2. Cervical radiculopathy       Precautions/Restrictions: None    Posture Observation: Poor seated    Cervical ROM:    Date: 2017     *Degrees AROM AROM AROM   Cervical Flexion 40     Cervical Extension 55      Right Left Right Left Right Left   Cervical Sidebending 30 20 + sx       Cervical Rotation 55 60 + sx         Upper Extremity Strength:  Date: 2017     Cervical Myotomes/5 Right Left Right Left Right Left   Cervical Flexion (C1-2) 5        Cervical Sidebending (C3) 5 5       Shoulder Elevation (C4) 5 5       Shoulder Abduction (C5) 5 5       Elbow Flexion (C6) 5 5       Elbow Extension (C7) 5 5       Wrist Flexion (C7) 5 5       Wrist Extension (C6) 5 5       Thumb abduction (C8) 5 5       Finger Abduction (T1) 5 5              Sensation      Reflex Testing  Cervical Dermatomes Right Left UE Reflexes Right Left   Back of the Head (C2)   Biceps (C5-6)     Supraclavicular Fossa (C3)   Brachioradialis (C5-6)     AC Joint (C4) N N Triceps (C7-8)     Lateral Biceps (C5) N N Delmy s test     Palmar Thumb (C6) N N LE Reflexes     Palmar 3rd Finger (C7) N N Patellar (L3-4)     Palmar 5th Finger (C8) N N Achilles (S1-2)     Ulnar Forearm (T1) N N Clonus     Index finger N Diminished, intact to light touch            Palpation: Tenderness at left levator scapulae, left side thoracic paraspinals    Cervical Special Tests  Cervical Special Tests Right Left UE Nerve Mobility Right Left   Cervical compression - - Median nerve  +   Cervical distraction - - Ulnar nerve  +   Spurling s test - + Radial nerve  -   Shoulder abduction sign - - Thoracic outlet     Deep neck flexor endurance test   Rachid     Upper cervical rotation   Adson s     Sharper-Kristina   Cervical rotation lateral flexion     Alar ligament test   Other:     Other:   Other:           Other tests:  + Tinel's sign at carpal tunnel right hand, reproduced paresthesia in index finger  -ve Tinel's at cubital tunnel  Nerve palpation in upper arm median/radial negative.  Radial palpation at elbow negative.  Median/ulnar nerve palpation in forearm negative.  No tenderness near coracoid process, brachial plexus and no reproduction of sx.    UE Screen:  Bilateral shoulder ROM normal (mildly limited right functional IR)  Good strength thoughout  Negative JohnMonty, Mely's, Cross-over, Full/Empty can, O'wiliam's, Yergason           Treatment Today     TREATMENT MINUTES COMMENTS   Evaluation 45    Self-care/ Home management     Manual therapy     Neuromuscular Re-education 15 Education on nerve glides through the carpal tunnel, education on nerves in the neck and facet pain referral patterns.   Therapeutic Activity     Therapeutic Exercises     Gait training     Modality__________________                 Total 60    Blank areas are intentional and mean the treatment did not include these items.     PT Evaluation Code: (Please list factors)  Patient History/Comorbidities: Gout  Examination: + painful ROM, decreased neurodynamics  Clinical Presentation: Stable  Clinical Decision Making: Low    Patient History/  Comorbidities Examination  (body structures and functions, activity limitations, and/or participation restrictions) Clinical Presentation Clinical Decision Making (Complexity)   No documented Comorbidities or personal factors 1-2 Elements Stable and/or uncomplicated Low   1-2 documented comorbidities or personal factor 3 Elements Evolving clinical presentation with changing characteristics Moderate   3-4 documented comorbidities or personal factors 4 or more Unstable and unpredictable High            Kendra Huntley DPT  1/20/2017  10:21 AM

## 2021-06-08 NOTE — PROGRESS NOTES
SUBJECTIVE   Martell Clement is a 36 y.o. old male with a past medical history of tobacco use disoder who presented to clinic today for further evaluation of shoulder pain with finger numbness and right knee pain. Doesn't remember what injury he had but his shoulder has been hurting for about 2 months. He doesn't remember specific incident of hurting his shoulder. He was working out at the gym for about a month then his pain came on. Pain comes on with certain movements. A month later his left index finger became numb. Shoulder pain is located in the posterior shoulder blade, associated with reaching cross body and can be triggered with cocking his head back. When he cocks his head back, there's numbness that is in the mid forearm and left index finger. The numbness bothers him with driving. The numbness of his index finger is located on the medial and haddad side of finger. Denies weakness. Has had shoulder problem in the right shoulder before associated with numbness of his right index finger but this resolved but has not had problem with the left shoulder before.    Right knee pain - reports he had a gout flare up about 3 weeks ago, was seen at [facility], given medications and crutches for 2 weeks but now although pain has resolved and knee swelling has improved he's unable to straighten his knee completely without pain. He feels that he's limping sometimes. He doesn't want to be on a preventive medication for gout as this was his first episode of gout attack in the knee. He's worried about using his right knee because he doesn't want it to be damaged further from the gout crystals. So he wants to know if it's ok to use the knee, to straighten it, to bear weight on it.       Review of Systems:  A 12 point comprehensive review of systems was negative except as noted in HPI.    Medical History  Active Ambulatory (Non-Hospital) Problems    Diagnosis     Gout     Compulsive tobacco user syndrome      No past medical history on file.    Surgical History  He  has a past surgical history that includes Hernia repair (Right).    Social History  Reviewed, and he  reports that he quit smoking about 4 weeks ago. He does not have any smokeless tobacco history on file. He reports that he does not drink alcohol or use illicit drugs.    Family History  Reviewed, and family history includes Cancer in his maternal grandfather; Diabetes in his father, paternal grandfather, paternal grandmother, and paternal uncle; Heart disease in his father, paternal grandfather, and paternal uncle.    Medications  Reviewed and None     Allergies  No Known Allergies      OBJECTIVE  Physical Exam:  Vital signs:   Visit Vitals     /80     Pulse 64     Temp 98.6  F (37  C) (Oral)     Resp 14     Wt 181 lb (82.1 kg)     BMI 27.52 kg/m2       Weight: 181 lb (82.1 kg)    General appearance: alert, appears stated age, cooperative and in no distress  Neck: Cervical spine non-tender to palpation. Full ROM. Spurlings positive on the left.   Shoulder: skin without erythema, swelling or ecchymosis. No atrophy noted. Non-tender to palpation over the SC joint, clavicle, AC joint, or bicipital groove. Full ROM. Rotator cuff strength testing 5/5 bilaterally (abduction, external and internal rotation). Negative Neer's. No pain with cross body adduction. No winging of scapula or abnormal scapular movements noted. CMS intact.   Extremities: strength 5/5 and equal in bilateral upper extremities  Knee: Grossly normal in appearance. No overlying erythema or ecchymosis. No swelling noted. No tenderness to palpation over joint line. Full ROM with flexion and full extension but with reported pain. Negative Lachman's and posterior drawer. No laxity with varus and valgus stress. Negative McMurrays. No patellar apprehension or pain with patellar compression. Pes anserine bursa nontender. CMS intact.  Skin: no rashes  Neuro: grossly normal, decreased sensation  to light touch on palmar and medial side of left index finger, reflexes intact bilateral upper extremities.   Psych: normal affect, appropriate conversation       ASSESSMENT/ PLAN    1. Cervical radiculopathy at C6  36 year old male presenting with complaint of left shoulder pain and numbness to left index finger for 1-2 months without specific injury/trauma. Examination today significant for pain vaguely located to the posterior shoulder blade region and reported numbness/tingling to the left mid forearm and index finger. Spurling's test was positive and testing of the left sided rotator cuff was negative for any intrinsic cause of his pain. This is more likely an extrinsic cause of pain and exam points of most likely cervical nerve root compression involving C6 (to involve index finger) or cervical spine disease. I recommended an MRI to further evaluate and patient declines imaging however would be open to physical therapy in the mean time and if his symptoms do not improve with physical therapy he would be open to imaging. I think this is reasonable approach. He has no other systemic symptoms to point to a malignant lesion leading to his symptoms.   - Ambulatory referral to Physical Therapy    2. Right knee pain  Recent gout flare. Exam today unremarkable except reported pain on full extension. Advised patient to continue to take NSAIDS as needed, ice/head pads, and he can start using his knee again. RTC as needed. Patient verbalized understanding.      Ksenia Pro MD

## 2021-06-09 NOTE — PROGRESS NOTES
Assessment & Plan:  1. Ankle swelling, left  Check levels today, patient suspects gout flare as no known injury to ankle.   - Uric Acid    2. Acute idiopathic gout of right foot  Uric acid high, treat current flare with indomethacin. Patient will follow up for labs and to discuss allopurinol if desired.   - indomethacin (INDOCIN) 50 MG capsule; Take 1 capsule (50 mg total) by mouth 2 (two) times a day with meals.  Dispense: 60 capsule; Refill: 2      There are no Patient Instructions on file for this visit.    Orders Placed This Encounter   Procedures     Uric Acid     There are no discontinued medications.        Chief Complaint:   Chief Complaint   Patient presents with     Foot Pain     c/o left foot pain. Pt would like his uric acid levels checked     Medication Management       History of Present Illness:  Martell is a 37 y.o. male presenting to the clinic today with 2 days of left ankle pain. No known injury. History of gout and feels similar to previous flares. Came on suddenly 2 days ago. He previously had more frequent flares when heavily drinking, but has stopped drinking since December and quit smoking. He would like to avoid a daily medication if possible, but here today for evaluation of uric acid and symptoms.      Review of Systems:  All other systems are negative except as noted above.    PFSH:  Reviewed and updated.    Tobacco Use:  History   Smoking Status     Former Smoker     Quit date: 12/20/2016   Smokeless Tobacco     Not on file     Comment: used to smoke 1/2ppd       Vitals:  Vitals:    04/05/17 1354   BP: 134/66   Patient Site: Left Arm   Patient Position: Sitting   Cuff Size: Adult Regular   Pulse: 72   Resp: 16   Weight: 184 lb 9 oz (83.7 kg)     Wt Readings from Last 3 Encounters:   04/05/17 184 lb 9 oz (83.7 kg)   01/17/17 181 lb (82.1 kg)   07/29/15 188 lb (85.3 kg)       Physical Exam:  Constitutional:  Reveals an alert, cooperative, 37 year old male in no acute distress.  Vitals:  Per  nursing notes.  Skin:   Without rash, bruise, or palpable lesions.  Musculoskeletal: CMS intact, distal pulses intact. Left ankle with swelling anterior to the talus on medial aspect, tender over this area to palpation. Flexion of the foot painful in this area. No pain to resisted flexion or extension or internal/external rotation.  Psychiatric:  Mood appropriate, memory intact.     Data Reviewed:  Additional History from Old Records or Another Person Summarized (2 total): None.     Decision to Obtain Extra information (1 total): None.     Radiology Tests Summarized and Ordered (XRAY/CT/MRI/DXA) (1 total): None.    Labs Reviewed and Ordered (1 total): uric acid    Medicine Tests Summarized and Ordered (EKG/ECHO/COLONOSCOPY/EGD) (1 total): None.    Independent Review of EKG or X-Ray (2 each): None.    The visit lasted a total of 20 minutes face to face with the patient. Over 50% of the time was spent counseling and educating the patient about plan of care.    Medications:  Current Outpatient Prescriptions   Medication Sig Dispense Refill     cholecalciferol, vitamin D3, (VITAMIN D3) 1,000 unit capsule Take 1,000 Units by mouth daily.       OMEGA-3/DHA/EPA/FISH OIL (FISH OIL-OMEGA-3 FATTY ACIDS) 300-1,000 mg capsule Take 2 g by mouth daily.       vitamin E 100 UNIT capsule Take 100 Units by mouth daily.       indomethacin (INDOCIN) 50 MG capsule Take 1 capsule (50 mg total) by mouth 2 (two) times a day with meals. 60 capsule 2     multivitamin capsule Take 1 capsule by mouth daily.       nicotine (NICODERM CQ) 21 mg/24 hr Place 21 mg on the skin.       No current facility-administered medications for this visit.        Total Data Points: 1    MACHO Sutherland, CNP    This note has been dictated using voice recognition software. Any grammatical or context distortions are unintentional and inherent to the software

## 2021-06-14 NOTE — TELEPHONE ENCOUNTER
Reason for Call:  Request for results:    Name of test or procedure: labs drawn on 1/25/2021    Date of test of procedure: 1/25/2021    Location of the test or procedure: [facility]    OK to leave the result message on voice mail or with a family member? Yes- ok to leave detailed message    Phone number Patient can be reached at: Home number on file [phone] (home)    Additional comments: pt calling as he can see the lab results in mychart but does not know what they mean.  Would like a call back to discuss results.    Call taken on 1/26/2021 at 2:23 PM by Rima Rojas

## 2021-06-14 NOTE — TELEPHONE ENCOUNTER
This was sent in a separate Meeps message - I just pasted into this one. FYI. See below.    Was this just a doubled lab request? I don't see any data. Also, the only reason I made appointment (I'm unemployed & paying cash) was for a Kidneys check, what do these labs say about my Kidneys? Also, what do the labs say about diabetes?     I have no other questions about the labs after this.

## 2021-06-14 NOTE — TELEPHONE ENCOUNTER
Please call patient and let him know I have reviewed his lab result and commented. I sent another medication (prednisone) to help with flare. He can respond via [facility] and let me know what questions he has.

## 2021-06-14 NOTE — PROGRESS NOTES
"  Assessment & Plan     Martell was seen today for establish care.    Diagnoses and all orders for this visit:    Acute idiopathic gout of right foot  -     Cancel: Uric Acid  -     Cancel: HM1(CBC and Differential)  -     Cancel: Comprehensive Metabolic Panel  -     Cancel: Lipid Cascade RANDOM  -     allopurinoL (ZYLOPRIM) 100 MG tablet; Take 1 tablet (100 mg total) by mouth daily.  -     colchicine 0.6 mg tablet; Take 1 tablet (0.6 mg total) by mouth 2 (two) times a day.  -     Discontinue: indomethacin (INDOCIN) 50 MG capsule; Take 1 capsule (50 mg total) by mouth 2 (two) times a day with meals.  -     indomethacin (INDOCIN) 25 MG capsule; Take 2 capsules (50 mg total) by mouth 2 (two) times a day with meals.  -     Comprehensive Metabolic Panel; Future  -     HM1(CBC and Differential); Future  -     Lipid Cascade RANDOM; Future  -     Uric Acid; Future  -     Comprehensive Metabolic Panel  -     HM1(CBC and Differential)  -     Lipid Cascade RANDOM  -     Uric Acid  -     Custom LDL Cholesterol, Direct    Family history of diabetes mellitus  -     Cancel: Glycosylated Hemoglobin A1c  -     Glycosylated Hemoglobin A1c; Future  -     Glycosylated Hemoglobin A1c    }     Tobacco Cessation:   reports that he has been smoking. He uses smokeless tobacco.  I have counseled the patient for tobacco cessation and the follow up will occur  at the next visit.  BMI:   Estimated body mass index is 28.42 kg/m  as calculated from the following:    Height as of this encounter: 5' 7.5\" (1.715 m).    Weight as of this encounter: 184 lb 3.2 oz (83.6 kg).   The following high BMI interventions were performed this visit: encouragement to exercise and lifestyle education regarding diet      Return in about 6 months (around 7/25/2021) for Annual physical, chronic medical conditions.    Ksenia Pro MD  [facility]    Subjective     Martell Clement is 40 y.o. and presents to clinic today for the following " "health issues   HPI   Chief Complaint   Patient presents with     Establish Care     Right foot possibly gout- very painful, pt is using crutches currently.      Did not have any gout attack since 2018 until 12/2020 when he started getting pain in the right knee then right knee improved and he got pain in his right toe and now the pain has settled in the right heel. He wants to treat gout with natural remedies. He doesn't want to stay on allopurinol long term. Wants to get lab checked to ensure normal kidney function. He has many questions with regards to gout, the current flare up, and kidney concern if gout is not treated. He wants to know if the supplements he's taking will interact with gout medications.     Review of Systems  Negative except as below.       Objective    BP (!) 142/97 (Patient Site: Left Arm, Patient Position: Sitting, Cuff Size: Adult Large)   Pulse (!) 117   Temp 98  F (36.7  C) (Oral)   Ht 5' 7.5\" (1.715 m)   Wt 184 lb 3.2 oz (83.6 kg)   SpO2 96%   BMI 28.42 kg/m    Body mass index is 28.42 kg/m .  Physical Exam  GENERAL: Healthy, alert and no distress  EYES: Eyes grossly normal to inspection. No discharge or erythema, or obvious scleral/conjunctival abnormalities.  RESP: lungs clear to auscultation - no rales, rhonchi or wheezes, no rales , no rhonchi and no wheezes  CV: regular rates and rhythm, normal S1 S2, no S3 or S4 and no murmur, click or rub  MSK: TTP of the right ankle. No swelling of joint space. Normal right knee and right foot.   NEURO: Cranial nerves grossly intact. Mentation and speech appropriate for age.  PSYCH: Mentation appears normal, affect normal/bright, judgement and insight intact, normal speech and appearance well-groomed      "

## 2021-06-15 NOTE — PROGRESS NOTES
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 He is included on the Internet a lot and has a lot of questions of from what he is seeing regarding allopurinol, autoimmune issues, gout, other arthritides, she interactions between psoriasis medications, bio equivalent medications and many other topics that we covered today. At the end of it, he seems to be excepting that allopurinol will be a good thing for him to be on and I upped his dose to 300 mg a day as I think that would be much more effective dose for him. He has plenty of the colchicine that he will use for gout flares and he still has this ongoing knee pain and that seems to be not getting better but he also seems to be a bit undertreated. We also did do some blood work as listed out below who can follow-up with him when the results of the become available. Most of the day was just really spent talking and counseling and reassuring him that the treatment plan that his prior physicians have had is been good and that he should trust the process and take the medication as prescribed and he will do that and come back in a couple of months for review.    - Erythrocyte Sedimentation Rate  - Uric Acid  - Antinuclear Antibody (KELLY) [place]  - Lyme Antibody [place]    - allopurinoL (ZYLOPRIM) 300 MG tablet; Take 1 tablet (300 mg total) by mouth daily.    Review of prior external note(s) from - labs from other providers and notes from other providers  Review of the result(s) of each unique test - labs reviewed independently  Independent interpretation of a test performed by another physician/other qualified health care professional (not separately reported) - labs done by others.      40 minutes spent on the date of the encounter doing chart review, review of outside records, review of test results, interpretation of tests,  patient visit and documentation            No follow-ups on file.    Turner Clement MD  [facility]     Martell Clement is 40 y.o. and presents to clinic today for the following health issues   HPI     Patient here today new patient to this clinic who has been seen at other clinics here within the system as well as other systems outside of here. He has gout and a very high uric acid level. He was appropriately suggested starting allopurinol by a physician who is seen prior to me, but he is never taken the allopurinol. He also has been told to take colchicine but he has been a bit hesitant to do that. He has a fear prednisone so he will not take that. He has been using a lot of indomethacin but still has a lot of right knee pain and now also pain into the right ankle and right toe. He has been doing obviously, extensive research on the Internet, and has a very long list of questions that he wants to go through with me today in regards to treatment and diagnosis and genetic testing as well as multiple other issues. We went through pretty much all of his questions today and as mentioned above hopefully he can get onto a good plan now get himself better.      Review of Systems        Objective    /88 (Patient Site: Left Arm, Patient Position: Sitting, Cuff Size: Adult Regular)   Pulse 94   Wt 183 lb 1.6 oz (83.1 kg)   SpO2 97%   BMI 28.25 kg/m    Body mass index is 28.25 kg/m .  Physical Exam    He appears well in no acute distress. No limp. He does have a bit of a swelling of the right knee but is not all that red and is not all that warm. The foot also is not that swollen.

## 2021-06-15 NOTE — PROGRESS NOTES
Assessment & Plan     Chronic pain of right knee    X-ray shows no obvious fracture or deformity.  There is no fluid of significance seen.  I reassured him that this may be related to his gout from before or he may have tweaked it or sprained it a bit, but I told him to continue with what he has been doing and this should get better over time and if it does not he will let me know.  We could consider an MRI and I did bring that up today but he was not wanting to do that because of cost potentially to him but we can see how that goes going forward.      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      - HM1(CBC and Differential)  - Comprehensive Metabolic Panel  - Uric Acid    [id number]}     No follow-ups on file.    Turner Clement MD  [facility]   Martell Clement is 40 y.o. and presents today for the following health issues   HPI     Patient is here today for a follow-up of his gout.  We put him on allopurinol a couple of months ago.  He is here today for recheck of his uric acid and other labs.  He really has no side effects at all from the medication and is quite pleased with that he has not had any further gout attacks.    He does still have some concerns with his right knee however.  It still seems to bother him a bit and he wonders if there is anything going on with it such as fluid or some injury that he had to it.  Is not all that swollen.  No redness or warmth.  Does not like when he had gout in it.  He has been using some over-the-counter medications with some benefit.    Review of Systems    A comprehensive Review of Systems was performed, and other than the positives mentioned above, the ROS was negative.       Objective    /90 (Patient Site: Left Arm, Patient Position: Sitting, Cuff Size:  Adult Large)   Pulse 84   Wt 189 lb 9.6 oz (86 kg)   BMI 29.26 kg/m    Body mass index is 29.26 kg/m .  Physical Exam    Well-appearing male no acute distress.  Vital signs as noted.  He walks without a limp.  The right knee shows no fluid or redness or warmth.    X-ray read by me is normal.

## 2021-06-17 NOTE — PROGRESS NOTES
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  We can recheck a uric acid level today and see how that is going for him.  We can also recheck his kidney and liver function tests on the medication to make sure that that is going well also.  We can follow-up with him on the results of the become available we talked about this allopurinol being a chronic medication but he seems okay with that because it does seem to be helping his gout.  - Uric Acid  - Comprehensive Metabolic Panel    Review of the result(s) of each unique test - labs today  Ordering of each unique test  Prescription drug management  [id number]}     No follow-ups on file.    Turner Clement MD  [facility]   Martell Clement is 41 y.o. and presents today for the following health issues   HPI     Patient is here today to follow-up on his gout treatment.  We started him on allopurinol several weeks ago and he wanted to come back in and have his uric acid level checked today as well as some other kidney and liver markers to make sure that he is tolerating the medication well.  It does seem to be working as he has had no gout flares and is tolerating the medication quite well.    Review of Systems          Objective    /88 (Patient Site: Left Arm, Patient Position: Sitting, Cuff Size: Adult Regular)   Pulse 87   Wt 193 lb 12.8 oz (87.9 kg)   SpO2 97%   BMI 29.91 kg/m    Body mass index is 29.91 kg/m .  Physical Exam    Well-appearing male in no acute distress.  He seems to be doing well with his joints look good with no redness or warmth noted.

## 2021-06-17 NOTE — TELEPHONE ENCOUNTER
Central PA team  [phone]  Pool: HE PA MED ([id number])          PA has been initiated.       PA form completed and faxed insurance via Cover My Meds     Key:  [id number]      Medication:  Colchicine 0.6MG tablets      Insurance:  [employer]         Response will be received via fax and may take up to 5-10 business days depending on plan

## 2021-06-17 NOTE — TELEPHONE ENCOUNTER
I think this is meant for as it looks like this patient has started seeing you for primary care. Please address. Thank you. Ksenia Pro.

## 2021-06-17 NOTE — TELEPHONE ENCOUNTER
Incoming fax from pharmacy for colchicine 0.6 mg tablet  Please initiate PA process.    Key: [id number]  Last name: Clement  : 1980    Malia Coughlin

## 2021-06-17 NOTE — TELEPHONE ENCOUNTER
Telephone Encounter by Kristen Mcgrath at 5/20/2021 11:25 AM     Author: Kristen Mcgrath Service: -- Author Type: --    Filed: 5/20/2021 11:27 AM Encounter Date: 5/14/2021 Status: Signed    : Kristen Mcgrath       PRIOR AUTHORIZATION DENIED    Colchicine capsules are preferred over prescribed tablets    Denial Rational: Must try/fail at least two preferred medications.  Please note Colchicine 0.6mg capsules and brand Mitigare 0.6mg capsules are covered under patient's plan.  The capsule form is preferred over tablets which are non-formulary.              Appeal Information: This medication was denied. If physician would like to appeal because patient has contraindication or allergy to covered medication please write letter of medical necessity and route back to PA team to initiate.  If no further action is needed please close encounter thank you.

## 2021-06-19 NOTE — LETTER
Letter by Elton Bellamy MD at      Author: Elton Bellamy MD Service: -- Author Type: --    Filed:  Encounter Date: 7/17/2019 Status: (Other)         July 17, 2019     Patient: Martell Clement   YOB: 1980   Date of Visit: 7/17/2019       To Whom it May Concern:    Martell Clement was seen in my clinic on 7/17/2019.  He is unable to work from 7/17/19-7/19/19        Sincerely,         Electronically signed by Elton Bellamy MD

## 2021-06-20 NOTE — LETTER
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  He lives with his elderly mother who is considered at increased risk of serious complications from COVID-19.  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    If you have any questions or concerns, please don't hesitate to call.    Sincerely,        Electronically signed by Dr. Jesusita Barth

## 2021-06-20 NOTE — PROGRESS NOTES
1. Hand abrasion, left, initial encounter        Plan: I told him that he needs to  get this wound some air at times now.  It seems that he has been really wrap on it and the keeping a tight hypo-thenar area of the hand is showing some signs of maceration from having some moisture tight onto that skin without a chance to breathe.  He will do this, and will just put a Band-Aid on it during the day with some bacitracin, and then try to carried out at night as much as he can.  We  discussed signs that he can watch for for worsening infection such as increased redness purulent drainage ascending redness or fever.  We will follow-up in afterwards as needed.    Subjective: 38-year-old male who is here today for a hand wound which occurred on his left hand 3 days ago.  He was trying to get rid of a spider that was in his apartment, and he was using a broom to lu it away but there is no soft and on the top of the room and he hit the left hand on the top of the sharp edge of the broom and caused a flap to come off of the skin.  He took the flap off himself and simply wrapped it up for a day or so.  He is then been using a lot of antibacterial ointments, as well as hand  and hydrogen peroxide in an effort to try to prevent infection.  A bit of this anxiety around potential infection stems from the fact that he actually had a friend to get a wound infection and passed away from it, so he is pretty anxious that this is going to happen here.  He has not been seen for this until today.  He has been using a very tight wrap with clear medical tape that he got at the drugstore and it has not had much of a chance to breathe when it is not being treated.  He has no redness of the arm, and no purulent drainage.    Objective: Well-appearing male in no acute distress.  Vital signs as noted.  The left hand examination shows an area about the size of a dime on his palm near the hypothenar eminence that is an open wound not all  the way through the layers of the skin the hand surrounding it shows some signs of oral moisturization including whiteness in the hyperthenar area.  There is good healing tissue underneath it..  There is no foreign body visible.  It looks actually quite healthy.

## 2021-06-25 NOTE — TELEPHONE ENCOUNTER
Pt calling back to discuss this with provider as he doesn't understand why this is denied.      Ok to leave voicemail

## 2021-06-25 NOTE — TELEPHONE ENCOUNTER
Capsules called in for him, instead of tablets.    Not sure why the pharmacy didn't just substitute this.    TS

## 2021-07-08 ENCOUNTER — COMMUNICATION - HEALTHEAST (OUTPATIENT)
Dept: FAMILY MEDICINE | Facility: CLINIC | Age: 41
End: 2021-07-08

## 2021-07-08 NOTE — TELEPHONE ENCOUNTER
Telephone Encounter by Marcello Kerr CMA at 7/8/2021  8:51 AM     Author: Marcello Kerr CMA Service: -- Author Type: Certified Medical Assistant    Filed: 7/8/2021  8:53 AM Encounter Date: 7/7/2021 Status: Signed    : Marcello Kerr CMA (Certified Medical Assistant)       Chart reviewed. Please review findings below.     Last office visit: 07/07/21 OV   Last ordered: N/A   Last lab check: 07/07/21  UC in-process   Next appointment: NONE     Assessment and Plan   1. Microscopic hematuria  Urinalysis-UC if Indicated     Culture, Urine   2. 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  He does note that he has had strong smelling urine so the urine will be sent for culture.  There is no further work-up that needs to be done at this time.  Patient is continuing to smoke I strongly advised him to quit smoking.  He states he is working on it.  He also has a history of gout and has recently started on allopurinol for this he had multiple questions regarding treatment for this.  I spent some time counseling him regarding this.

## 2021-07-09 ENCOUNTER — COMMUNICATION - HEALTHEAST (OUTPATIENT)
Dept: FAMILY MEDICINE | Facility: CLINIC | Age: 41
End: 2021-07-09

## 2021-11-09 ENCOUNTER — OFFICE VISIT (OUTPATIENT)
Dept: FAMILY MEDICINE | Facility: CLINIC | Age: 41
End: 2021-11-09
Payer: COMMERCIAL

## 2021-11-09 VITALS
WEIGHT: 193.1 LBS | BODY MASS INDEX: 29.78 KG/M2 | HEART RATE: 112 BPM | DIASTOLIC BLOOD PRESSURE: 80 MMHG | SYSTOLIC BLOOD PRESSURE: 100 MMHG

## 2021-11-09 DIAGNOSIS — M1A.09X1 IDIOPATHIC CHRONIC GOUT OF MULTIPLE SITES WITH TOPHUS: Primary | ICD-10-CM

## 2021-11-09 PROBLEM — M54.30 SCIATICA: Status: ACTIVE | Noted: 2021-11-09

## 2021-11-09 PROBLEM — F41.9 ANXIETY DISORDER: Status: ACTIVE | Noted: 2021-11-09

## 2021-11-09 LAB
ALBUMIN SERPL-MCNC: 4.6 G/DL (ref 3.5–5)
ALP SERPL-CCNC: 94 U/L (ref 45–120)
ALT SERPL W P-5'-P-CCNC: 58 U/L (ref 0–45)
ANION GAP SERPL CALCULATED.3IONS-SCNC: 11 MMOL/L (ref 5–18)
AST SERPL W P-5'-P-CCNC: 32 U/L (ref 0–40)
BASOPHILS # BLD AUTO: 0 10E3/UL (ref 0–0.2)
BASOPHILS NFR BLD AUTO: 0 %
BILIRUB SERPL-MCNC: 0.8 MG/DL (ref 0–1)
BUN SERPL-MCNC: 16 MG/DL (ref 8–22)
CALCIUM SERPL-MCNC: 10.2 MG/DL (ref 8.5–10.5)
CHLORIDE BLD-SCNC: 107 MMOL/L (ref 98–107)
CO2 SERPL-SCNC: 24 MMOL/L (ref 22–31)
CREAT SERPL-MCNC: 1.15 MG/DL (ref 0.7–1.3)
EOSINOPHIL # BLD AUTO: 0.5 10E3/UL (ref 0–0.7)
EOSINOPHIL NFR BLD AUTO: 7 %
ERYTHROCYTE [DISTWIDTH] IN BLOOD BY AUTOMATED COUNT: 12.1 % (ref 10–15)
GFR SERPL CREATININE-BSD FRML MDRD: 80 ML/MIN/1.73M2
GLUCOSE BLD-MCNC: 96 MG/DL (ref 70–125)
HCT VFR BLD AUTO: 51.9 % (ref 40–53)
HGB BLD-MCNC: 18.7 G/DL (ref 13.3–17.7)
IMM GRANULOCYTES # BLD: 0 10E3/UL
IMM GRANULOCYTES NFR BLD: 0 %
LYMPHOCYTES # BLD AUTO: 2 10E3/UL (ref 0.8–5.3)
LYMPHOCYTES NFR BLD AUTO: 26 %
MCH RBC QN AUTO: 31.3 PG (ref 26.5–33)
MCHC RBC AUTO-ENTMCNC: 36 G/DL (ref 31.5–36.5)
MCV RBC AUTO: 87 FL (ref 78–100)
MONOCYTES # BLD AUTO: 0.7 10E3/UL (ref 0–1.3)
MONOCYTES NFR BLD AUTO: 9 %
NEUTROPHILS # BLD AUTO: 4.4 10E3/UL (ref 1.6–8.3)
NEUTROPHILS NFR BLD AUTO: 58 %
PLATELET # BLD AUTO: 259 10E3/UL (ref 150–450)
POTASSIUM BLD-SCNC: 5.1 MMOL/L (ref 3.5–5)
PROT SERPL-MCNC: 7.6 G/DL (ref 6–8)
RBC # BLD AUTO: 5.98 10E6/UL (ref 4.4–5.9)
SODIUM SERPL-SCNC: 142 MMOL/L (ref 136–145)
URATE SERPL-MCNC: 5.8 MG/DL (ref 3–8)
WBC # BLD AUTO: 7.6 10E3/UL (ref 4–11)

## 2021-11-09 PROCEDURE — 36415 COLL VENOUS BLD VENIPUNCTURE: CPT | Performed by: FAMILY MEDICINE

## 2021-11-09 PROCEDURE — 85025 COMPLETE CBC W/AUTO DIFF WBC: CPT | Performed by: FAMILY MEDICINE

## 2021-11-09 PROCEDURE — 80053 COMPREHEN METABOLIC PANEL: CPT | Performed by: FAMILY MEDICINE

## 2021-11-09 PROCEDURE — 99213 OFFICE O/P EST LOW 20 MIN: CPT | Performed by: FAMILY MEDICINE

## 2021-11-09 PROCEDURE — 84550 ASSAY OF BLOOD/URIC ACID: CPT | Performed by: FAMILY MEDICINE

## 2021-11-09 RX ORDER — PREDNISONE 5 MG/1
5 TABLET ORAL PRN
COMMUNITY
Start: 2021-05-13 | End: 2021-12-22

## 2021-11-09 RX ORDER — COLCHICINE 0.6 MG/1
0.6 CAPSULE ORAL
COMMUNITY
Start: 2021-06-01 | End: 2021-12-22

## 2021-11-09 RX ORDER — INDOMETHACIN 25 MG/1
50 CAPSULE ORAL
COMMUNITY
Start: 2021-01-25 | End: 2021-11-09

## 2021-11-09 RX ORDER — INDOMETHACIN 50 MG/1
CAPSULE ORAL
COMMUNITY
End: 2021-12-22

## 2021-11-09 RX ORDER — ZINC GLUCONATE 50 MG
50 TABLET ORAL
COMMUNITY
End: 2021-12-22

## 2021-11-09 RX ORDER — NICOTINE 21 MG/24HR
PATCH, TRANSDERMAL 24 HOURS TRANSDERMAL
COMMUNITY
End: 2021-12-22

## 2021-11-11 NOTE — PROGRESS NOTES
"  Assessment & Plan     Idiopathic chronic gout of multiple sites with flavio    We can get some blood work today and see where his uric acid level is at on his allopurinol today.  We also get some other blood work today including a CBC and complete metabolic panel and we can follow-up on the results of that when it becomes available.  For now, he is using indomethacin for flares as needed which seems to be working pretty well.  He does not use the colchicine really much at all.      - CBC with platelets and differential; Future  - Comprehensive metabolic panel (BMP + Alb, Alk Phos, ALT, AST, Total. Bili, TP); Future  - Uric acid; Future  - CBC with platelets and differential  - Comprehensive metabolic panel (BMP + Alb, Alk Phos, ALT, AST, Total. Bili, TP)  - Uric acid    Review of external notes as documented elsewhere in note  Review of the result(s) of each unique test - labs  Ordering of each unique test  Prescription drug management         BMI:   Estimated body mass index is 29.78 kg/m  as calculated from the following:    Height as of 7/7/21: 1.715 m (5' 7.52\").    Weight as of this encounter: 87.6 kg (193 lb 1.6 oz).           No follow-ups on file.    Turner Clement MD  [facility]    Jovan Moura is a 41 year old who presents for the following health issues     HPI     Patient is here today for management of his gout and for medication refills.  He would like to get uric acid level done and see how his uric acid level is on the allopurinol that he is taking.  He gets gout flares occasionally and uses indomethacin mainly for them.  He does have some colchicine that she has used in the past occasionally as well.      Review of Systems   Constitutional, HEENT, cardiovascular, pulmonary, gi and gu systems are negative, except as otherwise noted.      Objective    /80 (BP Location: Left arm, Patient Position: Sitting, Cuff Size: Adult Large)   Pulse 112   Wt 87.6 kg " (193 lb 1.6 oz)   BMI 29.78 kg/m    Body mass index is 29.78 kg/m .  Physical Exam   GENERAL: healthy, alert and no distress  NECK: no adenopathy, no asymmetry, masses, or scars and thyroid normal to palpation  RESP: lungs clear to auscultation - no rales, rhonchi or wheezes  CV: regular rate and rhythm, normal S1 S2, no S3 or S4, no murmur, click or rub, no peripheral edema and peripheral pulses strong  ABDOMEN: soft, nontender, no hepatosplenomegaly, no masses and bowel sounds normal  MS: no gross musculoskeletal defects noted, no edema

## 2021-12-15 ENCOUNTER — VIRTUAL VISIT (OUTPATIENT)
Dept: FAMILY MEDICINE | Facility: CLINIC | Age: 41
End: 2021-12-15
Payer: COMMERCIAL

## 2021-12-15 ENCOUNTER — LAB (OUTPATIENT)
Dept: FAMILY MEDICINE | Facility: CLINIC | Age: 41
End: 2021-12-15
Attending: FAMILY MEDICINE
Payer: COMMERCIAL

## 2021-12-15 DIAGNOSIS — R50.9 FEVER, UNSPECIFIED FEVER CAUSE: ICD-10-CM

## 2021-12-15 DIAGNOSIS — R50.9 FEVER, UNSPECIFIED FEVER CAUSE: Primary | ICD-10-CM

## 2021-12-15 PROBLEM — S13.4XXA WHIPLASH INJURIES, INITIAL ENCOUNTER: Status: ACTIVE | Noted: 2018-08-09

## 2021-12-15 PROBLEM — L40.9 PSORIASIS OF NAIL: Status: ACTIVE | Noted: 2020-08-04

## 2021-12-15 LAB
FLUAV AG SPEC QL IA: NEGATIVE
FLUBV AG SPEC QL IA: NEGATIVE

## 2021-12-15 PROCEDURE — 99207 PR NO CHARGE LOS: CPT

## 2021-12-15 PROCEDURE — 87804 INFLUENZA ASSAY W/OPTIC: CPT

## 2021-12-15 PROCEDURE — U0005 INFEC AGEN DETEC AMPLI PROBE: HCPCS

## 2021-12-15 PROCEDURE — U0003 INFECTIOUS AGENT DETECTION BY NUCLEIC ACID (DNA OR RNA); SEVERE ACUTE RESPIRATORY SYNDROME CORONAVIRUS 2 (SARS-COV-2) (CORONAVIRUS DISEASE [COVID-19]), AMPLIFIED PROBE TECHNIQUE, MAKING USE OF HIGH THROUGHPUT TECHNOLOGIES AS DESCRIBED BY CMS-2020-01-R: HCPCS

## 2021-12-15 PROCEDURE — 99213 OFFICE O/P EST LOW 20 MIN: CPT | Mod: 95 | Performed by: FAMILY MEDICINE

## 2021-12-15 NOTE — PATIENT INSTRUCTIONS
Instructions for Patients  It is recommended that you have a test for coronavirus (COVID-19). This illness can cause fever, cough and trouble breathing. Many people get a mild case and get better on their own. Some people can get very sick.     Please follow these steps:    1. We will call to schedule your test.  2. A member of our care team will ask you some questions. Then, they will use a swab to collect samples from your nose and throat.     Our testing team will send you your test results.    How can I protect others?    Stay home and away from others (self-isolate) until:    You ve had no fever--and no medicine that reduces fever--for 1 full day (24 hours). And      Your other symptoms have resolved (gotten better). For example, your cough or breathing has improved. And     At least 10 days have passed since your symptoms started.    Stay at least 6 feet away from others. (If someone will drive you to your test, stay in the backseat, as far away from the  as you can.)     Don t go to work, school or anywhere else. When it s time for your test, go straight to the testing site. Don t make any stops on the way there or back.     Wash your hands and face often. Use soap and water.     Cover your mouth and nose with a mask, tissue or washcloth.     Don t touch anyone. No hugging, kissing or handshakes.    How can I take care of myself?    1. Get lots of rest. Drink extra fluids (unless a doctor has told you not to).     2. Take Tylenol (acetaminophen) for fever or pain. If you have liver or kidney problems, ask your family doctor if it's okay to take Tylenol.     Adults can take either:     650 mg (two 325 mg pills) every 4 to 6 hours, or     1,000 mg (two 500 mg pills) every 8 hours as needed.     Note: Don't take more than 3,000 mg in one day.   Acetaminophen is found in many medicines (both prescribed and over-the-counter medicines). Read all labels to be sure you don't take too much.   For children,  check the Tylenol bottle for the right dose. The dose is based on  the child's age or weight.    3. If you have other health problems (like cancer, heart failure, an organ transplant or severe kidney disease): Call your specialty clinic if you don't feel better in the next 2 days.    4. Know when to call 911: If your breathing is so bad that it keeps you from doing normal activities, call 911 or go to the emergency room. Tell them that you've been staying home and may have COVID-19.      Thank you for limiting contact with others, wearing a simple mask to cover your cough, practice good hand hygiene habits and accessing our virtual services where possible to limit the spread of this virus.    For more information about COVID19 and options for caring for yourself at home, please visit the CDC website at https://www.cdc.gov/coronavirus/2019-ncov/about/steps-when-sick.html  For more options for care at [facility], please visit our website at [email or web address]

## 2021-12-15 NOTE — PROGRESS NOTES
"    Martell is a 41 year old who is being evaluated via a billable video visit.      How would you like to obtain your AVS? Joseph anyone else be joining your video visit? No    Video Start Time: 10:53 AM    Assessment & Plan   Problem List Items Addressed This Visit     None      Visit Diagnoses     Fever, unspecified fever cause    -  Primary  Test for flu and covid ordered, will treat depending on results.       Relevant Orders    Influenza A/B antigen    Symptomatic; Yes; 12/13/2021 COVID-19 Virus (Coronavirus) by PCR Nasopharyngeal              BMI:   Estimated body mass index is 29.78 kg/m  as calculated from the following:    Height as of 7/7/21: 1.715 m (5' 7.52\").    Weight as of 11/9/21: 87.6 kg (193 lb 1.6 oz).       No follow-ups on file.    Melly Hong MD  [facility]    Chief Complaint   Patient presents with     Viral Syndrome     fever, body aches,shivers and diarrhea, have not taken COVID test yet        Subjective   Martell is a 41 year old who presents for the following health issue    Says he has been sick for 48 hours.         History:  In the 14 days before your symptoms started, have you had close contact with a COVID-19 (Coronavirus) patient? Doesn't know - possibly.     Do you have a fever? Yes, I felt feverish or had chills  His highest fever was 101.5.     Are you having difficulty breathing? No    Do you have a cough? Yes, it's a dry cough.     Are you experiencing any of the following? None of these    What other symptoms have you experienced? Runny Nose, Blocked Sinuses and Body aches  No swollen gland, sore throat or headache.     Do you have any of the following? Fatigue    Have you ever been diagnosed with asthma, bronchitis, or lung disease? No    Do you smoke? No  Quit 43 days ago.     Have you ever smoked? I smoked in the past, but quit       Are there people you know with similar symptoms? Yes     What was the patient(s) diagnosed with? Unknown. "     Have you recently been hospitalized? No      s    Objective    Vitals - Patient Reported  Temperature (Patient Reported): 97.9  F (36.6  C) (oral)        Physical Exam   GENERAL: Healthy, alert and no distress  EYES: Eyes grossly normal to inspection.  No discharge or erythema, or obvious scleral/conjunctival abnormalities.  RESP: No audible wheeze, cough, or visible cyanosis.  No visible retractions or increased work of breathing.    SKIN: Visible skin clear. No significant rash, abnormal pigmentation or lesions.  NEURO: Cranial nerves grossly intact.  Mentation and speech appropriate for age.  PSYCH: Mentation appears normal, affect normal/bright, judgement and insight intact, normal speech and appearance well-groomed.        Video-Visit Details    Type of service:  Video Visit    Video End Time:11:13 AM    Originating Location (pt. Location): Home    Distant Location (provider location):  [facility]     Platform used for Video Visit: [employer]

## 2021-12-16 LAB — SARS-COV-2 RNA RESP QL NAA+PROBE: POSITIVE

## 2021-12-17 ENCOUNTER — NURSE TRIAGE (OUTPATIENT)
Dept: NURSING | Facility: CLINIC | Age: 41
End: 2021-12-17
Payer: COMMERCIAL

## 2021-12-17 NOTE — TELEPHONE ENCOUNTER
Patient is complaining of brown-reddish sputum. Patient is positive for Covid-19 for 5 days. Caller denies any difficulty breathing. Patient denies any chest pain or pressure. Patient denies any fever.  Triage guidelines recommend to see pcp within 24 hours.   Caller verbalized and understands directives.  COVID 19 Nurse Triage Plan/Patient Instructions    Please be aware that novel coronavirus (COVID-19) may be circulating in the community. If you develop symptoms such as fever, cough, or SOB or if you have concerns about the presence of another infection including coronavirus (COVID-19), please contact your health care provider or visit [email or web address].     Disposition/Instructions    In-Person Visit with provider recommended. Reference Visit Selection Guide.    Thank you for taking steps to prevent the spread of this virus.  o Limit your contact with others.  o Wear a simple mask to cover your cough.  o Wash your hands well and often.    Resources    [facility]: About COVID-19: [email or web address]    CDC: What to Do If You're Sick: www.cdc.gov/coronavirus/2019-ncov/about/steps-when-sick.html    CDC: Ending Home Isolation: www.cdc.gov/coronavirus/2019-ncov/hcp/disposition-in-home-patients.html     CDC: Caring for Someone: www.cdc.gov/coronavirus/2019-ncov/if-you-are-sick/care-for-someone.html     [facility]: Interim Guidance for Hospital Discharge to Home: www.health.[email or web address].[place]/diseases/coronavirus/hcp/hospdischarge.pdf    [facility] clinical trials (COVID-19 research studies): [email or web address]     Below are the COVID-19 hotlines at the [place] Department of Health ([facility]). Interpreters are available.   o For health questions: Call 204-390-8304 or 1-122.861.3056 (7 a.m. to 7 p.m.)  o For questions about schools and childcare: Call 972-692-3413 or 1-857.623.2235 (7 a.m. to 7 p.m.)                   Reason for Disposition    [1] PERSISTING SYMPTOMS  OF COVID-19 AND [2] NEW symptom AND [3] that sounds mild    Coughing up omid-colored (reddish-brown) sputum    Additional Information    Negative: SEVERE difficulty breathing (e.g., struggling for each breath, speaks in single words)    Negative: [1] SEVERE weakness (e.g., can't stand or can barely walk) AND [2] new-onset or WORSE    Negative: Difficult to awaken or acting confused (e.g., disoriented, slurred speech)    Negative: Bluish (or gray) lips or face now    Negative: Sounds like a life-threatening emergency to the triager    Negative: [1] Typical COVID-19 symptoms AND [2] lasting less than 3 weeks    Negative: [1] Chest pain, pressure, or tightness AND [2] new-onset or worsening    Negative: [1] Fever AND [2] new-onset or worsening    Negative: [1] MODERATE difficulty breathing (e.g., speaks in phrases, SOB even at rest, pulse 100-120) AND [2] new-onset or WORSE    Negative: [1] MODERATE difficulty breathing AND [2] oxygen level (e.g., pulse oximetry) 91 to 94 percent    Negative: Oxygen level (e.g., pulse oximetry) 90 percent or lower    Negative: MODERATE difficulty breathing (e.g., speaks in phrases, SOB even at rest, pulse 100-120)    Negative: [1] Drinking very little AND [2] dehydration suspected (e.g., no urine > 12 hours, very dry mouth, very lightheaded)    Negative: Patient sounds very sick or weak to the triager    Negative: [1] MILD difficulty breathing (e.g., minimal/no SOB at rest, SOB with walking, pulse <100) AND [2] new-onset    Negative: Oxygen level (e.g., pulse oximetry) 91 to 94 percent    Negative: [1] PERSISTING SYMPTOMS OF COVID-19 AND [2] NEW symptom AND [3] could be serious    Negative: [1] Caller has URGENT question AND [2] triager unable to answer question    Negative: [1] PERSISTING SYMPTOMS OF COVID-19 AND [2] symptoms WORSE    Negative: [1] Caller has NON-URGENT question AND [2] triager unable to answer    Negative: [1] PERSISTING SYMPTOMS OF COVID-19 AND [2] NO medical visit  for COVID-19 in past 2 weeks    Negative: [1] PERSISTING SYMPTOMS OF COVID-19 AND [2] symptoms SAME AND [3] medical visit for COVID-19 in past 2 weeks    Negative: [1] PERSISTING SYMPTOMS OF COVID-19 AND [2] symptoms BETTER (improving)    Negative: Severe difficulty breathing (e.g., struggling for each breath, speaks in single words)    Negative: Bluish (or gray) lips or face now    Negative: [1] Difficulty breathing AND [2] exposure to flames, smoke, or fumes    Negative: [1] Stridor AND [2] difficulty breathing    Negative: Sounds like a life-threatening emergency to the triager    Negative: [1] Previous asthma attacks AND [2] this feels like asthma attack    Negative: Dry (non-productive) cough (i.e., no sputum or minimal clear sputum)    Negative: Chest pain  (Exception: MILD central chest pain, present only when coughing)    Negative: Difficulty breathing    Negative: Patient sounds very sick or weak to the triager    Negative: [1] Coughed up blood AND [2] > 1 tablespoon (15 ml) (Exception: blood-tinged sputum)    Negative: Fever > 103 F (39.4 C)    Negative: [1] Fever > 101 F (38.3 C) AND [2] age > 60    Negative: [1] Fever > 100.0 F (37.8 C) AND [2] bedridden (e.g., nursing home patient, CVA, chronic illness, recovering from surgery)    Negative: [1] Fever > 100.0 F (37.8 C) AND [2] diabetes mellitus or weak immune system (e.g., HIV positive, cancer chemo, splenectomy, organ transplant, chronic steroids)    Negative: Wheezing is present    Negative: SEVERE coughing spells (e.g., whooping sound after coughing, vomiting after coughing)    Negative: [1] Continuous (nonstop) coughing interferes with work or school AND [2] no improvement using cough treatment per Care Advice    Protocols used: CORONAVIRUS (COVID-19) PERSISTING SYMPTOMS FOLLOW-UP CALL-A- 8.25.2021, COUGH - ACUTE [id number]

## 2021-12-20 ENCOUNTER — MYC MEDICAL ADVICE (OUTPATIENT)
Dept: FAMILY MEDICINE | Facility: CLINIC | Age: 41
End: 2021-12-20
Payer: COMMERCIAL

## 2021-12-22 ENCOUNTER — VIRTUAL VISIT (OUTPATIENT)
Dept: FAMILY MEDICINE | Facility: CLINIC | Age: 41
End: 2021-12-22
Payer: COMMERCIAL

## 2021-12-22 DIAGNOSIS — U07.1 INFECTION DUE TO 2019 NOVEL CORONAVIRUS: Primary | ICD-10-CM

## 2021-12-22 PROCEDURE — 99213 OFFICE O/P EST LOW 20 MIN: CPT | Mod: 95 | Performed by: FAMILY MEDICINE

## 2021-12-22 NOTE — PROGRESS NOTES
Martell is a 41 year old who is being evaluated via a billable telephone visit.      What phone number would you like to be contacted at? [phone]  How would you like to obtain your AVS? MyChart    Assessment & Plan     Infection due to 2019 novel coronavirus  Diagnosed on December 15 still having ongoing fevers  As discussion per below discussed symptomatic treatment with Tylenol alternating with ibuprofen, continue with fluids and follow-up as needed but I told the patient it may take several weeks for full recovery      BMI:       No follow-ups on file.    Corona Ahumada MD  [facility]    Jovan Moura is a 41 year old who presents for the following health issues.  Patient was diagnosed with Covid on December 15, he began to be symptomatic 2 days prior to that, his main symptom is fever up to about 102 or so some cough.  He does not really feel extreme fatigue.  He actually was seen he says in an emergency room or urgent care setting yesterday where they did a chest x-ray to make sure he did not have a pneumonia apparently that was negative.    He was taking Tylenol 1000 mg 3 times a day is now switched to TheraFlu 650 mg 5 times a day and I told the patient that this is still an amount safe within normal limits.  He is wondering if he can take Advil on top of that and I told him he could take 400 mg up to 4 times a day alternating with Tylenol but to be careful about stomach upset and to consider having at least some food or fluid in his stomach when he takes this but I also think this is only going to be temporary and short-lived.    I discussed with the patient that it is impossible to predict how long he is going to be symptomatic but I would anticipate within a week or 2 he will probably have full or near full term recovery.     Review of Systems   Constitutional, HEENT, cardiovascular, pulmonary, gi and gu systems are negative, except as otherwise noted.       Objective    Vitals - Patient Reported  Weight (Patient Reported): 84.4 kg (186 lb)        Physical Exam   healthy, alert and no distress  PSYCH: Alert and oriented times 3; coherent speech, normal   rate and volume, able to articulate logical thoughts, able   to abstract reason, no tangential thoughts, no hallucinations   or delusions  His affect is normal  RESP: No cough, no audible wheezing, able to talk in full sentences  Remainder of exam unable to be completed due to telephone visits        Phone call duration: 15 minutes

## 2021-12-27 ENCOUNTER — DOCUMENTATION ONLY (OUTPATIENT)
Dept: HOME HEALTH SERVICES | Facility: CLINIC | Age: 41
End: 2021-12-27

## 2021-12-27 ENCOUNTER — OFFICE VISIT (OUTPATIENT)
Dept: FAMILY MEDICINE | Facility: CLINIC | Age: 41
End: 2021-12-27
Payer: COMMERCIAL

## 2021-12-27 ENCOUNTER — ANCILLARY PROCEDURE (OUTPATIENT)
Dept: GENERAL RADIOLOGY | Facility: CLINIC | Age: 41
End: 2021-12-27
Attending: FAMILY MEDICINE
Payer: COMMERCIAL

## 2021-12-27 VITALS
DIASTOLIC BLOOD PRESSURE: 72 MMHG | WEIGHT: 184 LBS | SYSTOLIC BLOOD PRESSURE: 118 MMHG | OXYGEN SATURATION: 91 % | HEART RATE: 115 BPM | BODY MASS INDEX: 28.38 KG/M2

## 2021-12-27 DIAGNOSIS — J12.82 PNEUMONIA DUE TO 2019 NOVEL CORONAVIRUS: Primary | ICD-10-CM

## 2021-12-27 DIAGNOSIS — U07.1 INFECTION DUE TO 2019 NOVEL CORONAVIRUS: ICD-10-CM

## 2021-12-27 DIAGNOSIS — U07.1 PNEUMONIA DUE TO 2019 NOVEL CORONAVIRUS: Primary | ICD-10-CM

## 2021-12-27 PROCEDURE — 71046 X-RAY EXAM CHEST 2 VIEWS: CPT | Mod: TC | Performed by: RADIOLOGY

## 2021-12-27 PROCEDURE — 99214 OFFICE O/P EST MOD 30 MIN: CPT | Performed by: FAMILY MEDICINE

## 2021-12-27 RX ORDER — AZITHROMYCIN 250 MG/1
TABLET, FILM COATED ORAL
Qty: 6 TABLET | Refills: 0 | Status: SHIPPED | OUTPATIENT
Start: 2021-12-27 | End: 2022-01-01

## 2021-12-27 RX ORDER — DEXAMETHASONE 6 MG/1
6 TABLET ORAL DAILY
Qty: 10 TABLET | Refills: 0 | Status: SHIPPED | OUTPATIENT
Start: 2021-12-27 | End: 2022-01-21

## 2021-12-27 NOTE — PROGRESS NOTES
Received intake call for home oxygen at 11:33AM from Dr. Barth at the [facility]. Reviewed patient's chart; Patient qualifies under insurance relaxed guidelines and all documentation is in the chart including a good order.   12:00PM- Called to offer choice and patient is okay with [facility] setting them up. Discussed equipment with patient and informed him that we will drop it off at his front door and instruct him over the phone. We will deliver this today and call him once the  is on the way. He agreed with the plan.   12:30PM- Called Dr. Jesusita Barth, left voicemail that we have everything we need and will be getting the patient set up today. Gave my direct call back number for any questions.    Patient has oxygen arranged through [employer] [facility].  Phone: [phone]  Fax: [phone]

## 2021-12-27 NOTE — PROGRESS NOTES
Problem List Items Addressed This Visit     None      Visit Diagnoses     Pneumonia due to 2019 novel coronavirus    -  Primary    Relevant Medications    dexamethasone (DECADRON) 6 MG tablet    azithromycin (ZITHROMAX) 250 MG tablet    Other Relevant Orders    Home Oxygen Order for DME - ONLY FOR DME    Infection due to 2019 novel coronavirus        Relevant Orders    XR Chest 2 Views (Completed)        Martell is a 41-year-old gentleman presenting today with worsening shortness of breath and a chest x-ray consistent with Covid pneumonia.  He has had worsening in the past 3 to 5 days.  I spoke with the emergency room physician who suggested that dexamethasone could be beneficial and a prescription for 6 mg daily dosing over 10 days was provided today.  I also prescribed a azithromycin to cover just in case he has a bacterial component to his pneumonia.  I ordered home oxygen as his oxygen level has been between 88 and 91% this morning.  I provided him with parameters and recommendations to go to the emergency room with worsening symptoms.    JESS Aldana   Martell is a 41 year old who presents today with worsening shortness of breath due to Covid 19 infection.  The patient became ill on December 13 and tested positive on 15 December.  He was seen in the emergency room at 1 point and had a chest x-ray which came back normal.  The patient reports over the past 3 to 5 days that he has had some worsening shortness of breath.  He was having ongoing fever up until about 24 hours ago when that seem to stop but he did have some night sweats last night.  He awoke this morning to feel quite short of breath and ultimately called EMS who came out, checked his oxygen level and recommended that he keep his appointment for today with me.  The patient denies any type of chest pain associated with this in addition to no swelling or calf pain.  He is generally healthy and has never been immunized against Covid.      Objective    /72 (BP Location: Left arm, Patient Position: Left side, Cuff Size: Adult Large)   Pulse 115   Wt 83.5 kg (184 lb)   SpO2 91%   BMI 28.38 kg/m    Body mass index is 28.38 kg/m .  Physical Exam   GENERAL: Anxious appearing gentleman who does appear labored in his breathing.  Chest: lungs clear  CV: RRR without murmur    Chest xray: Bilateral patchy infiltrates

## 2021-12-29 ENCOUNTER — VIRTUAL VISIT (OUTPATIENT)
Dept: URGENT CARE | Facility: CLINIC | Age: 41
End: 2021-12-29
Payer: COMMERCIAL

## 2021-12-29 DIAGNOSIS — R06.02 SOB (SHORTNESS OF BREATH): ICD-10-CM

## 2021-12-29 DIAGNOSIS — R09.02 HYPOXIA: ICD-10-CM

## 2021-12-29 DIAGNOSIS — U07.1 INFECTION DUE TO 2019 NOVEL CORONAVIRUS: Primary | ICD-10-CM

## 2021-12-29 PROCEDURE — 99215 OFFICE O/P EST HI 40 MIN: CPT | Mod: 95 | Performed by: PHYSICIAN ASSISTANT

## 2021-12-29 NOTE — PROGRESS NOTES
Martell is a 41 year old who is being evaluated via a billable telephone visit.      Assessment & Plan     Infection due to 2019 novel coronavirus  - budesonide (PULMICORT FLEXHALER) 90 MCG/ACT inhaler; Inhale 2 puffs into the lungs 2 times daily for 14 days    SOB (shortness of breath)  - budesonide (PULMICORT FLEXHALER) 90 MCG/ACT inhaler; Inhale 2 puffs into the lungs 2 times daily for 14 days    Hypoxia    I discussed symptoms and current treatment pan with patient. I answered questions about activity level as well as symptoms that should have him concerned or going to the ER for evaluation. I discussed that this type of pneumonia is different than typical pneumonia and that he may not have any cough or productivity and that is fine. I also explained that as long as his breathing is recovering there is no indication to re xray his lungs. At this point he is doing much better he is still a little short of breath so I will add an inhaled steroid and discussed use. He will follow up with his PCP on Monday as scheduled    Giulia Low PA-C  Virtual Urgent Care  [facility] VIRTUAL URGENT CARE    Subjective   Martell is a 41 year old who presents for the following health issues: Covid concern    HPI - Martell was diagnosed with Covid on 12/15, he was seen in the ER on 12/18 and then seen in clinic on 12/27 due to SOB and low oxygen. He was started on steroid and a z pack as well as home O2 on Monday and has improved since then but is still very concerned about his symptoms and his recovery. He has many questions about what to expect as well as how to take care of himself. He has been using an incentive spirometer and has been on 1L nasal canula O@. He is staying around 95-96 on that and does not feel overly short of breath unless he is active. He is concerned that he is not having much cough and is worried that if he has pneumonia he should be having a productive cough to get the infection out. He is no  longer having fevers, body aches or extreme fatigue. He is just very concerned that he is getting conflicting information and that he doesn't know how to properly take care of himself so he doesn't die of Covid.       Review of Systems   Constitutional, HEENT, cardiovascular, pulmonary, gi and gu systems are negative, except as otherwise noted.      Objective           Vitals:  No vitals were obtained today due to virtual visit.    Physical Exam   alert and anxious  PSYCH: Alert and oriented times 3; coherent speech, normal   rate and volume, able to articulate logical thoughts, able   to abstract reason, no tangential thoughts, no hallucinations   or delusions  His affect is anxious  RESP: No cough, no audible wheezing, able to talk in full sentences  Remainder of exam unable to be completed due to telephone visits    Phone call duration: 45 minutes

## 2022-01-03 ENCOUNTER — MYC MEDICAL ADVICE (OUTPATIENT)
Dept: FAMILY MEDICINE | Facility: CLINIC | Age: 42
End: 2022-01-03

## 2022-01-03 ENCOUNTER — OFFICE VISIT (OUTPATIENT)
Dept: FAMILY MEDICINE | Facility: CLINIC | Age: 42
End: 2022-01-03
Payer: COMMERCIAL

## 2022-01-03 VITALS
SYSTOLIC BLOOD PRESSURE: 110 MMHG | OXYGEN SATURATION: 96 % | DIASTOLIC BLOOD PRESSURE: 60 MMHG | WEIGHT: 182.1 LBS | HEART RATE: 111 BPM | BODY MASS INDEX: 28.08 KG/M2

## 2022-01-03 DIAGNOSIS — F06.4 ANXIETY DISORDER DUE TO KNOWN PHYSIOLOGICAL CONDITION: ICD-10-CM

## 2022-01-03 DIAGNOSIS — F41.9 POST-COVID CHRONIC ANXIETY: Primary | ICD-10-CM

## 2022-01-03 DIAGNOSIS — U09.9 POST-COVID CHRONIC ANXIETY: Primary | ICD-10-CM

## 2022-01-03 PROCEDURE — 99215 OFFICE O/P EST HI 40 MIN: CPT | Performed by: FAMILY MEDICINE

## 2022-01-03 RX ORDER — LORAZEPAM 0.5 MG/1
0.5 TABLET ORAL EVERY 6 HOURS PRN
Qty: 30 TABLET | Refills: 0 | Status: SHIPPED | OUTPATIENT
Start: 2022-01-03 | End: 2022-01-10

## 2022-01-06 NOTE — PROGRESS NOTES
"  Assessment & Plan     Post-COVID chronic anxiety    Anxiety disorder due to known physiological condition    We spent her entire visit counseling him and answering the multitude of questions that he had around his Covid diagnosis.  He is tremendously anxious about all of this and I think physically he is actually doing pretty well.  He is talking in complete sentences, he demonstrates no shortness of breath or wheezing.  He is not working hard to breathe and his color is quite good.  He admits to having a lot of anxiety about this and is terrified that he is going to have in his words, \"get ARDS and die\".  I reassured him that he is beyond the timeframe where I would expect him to have significant complications of his Covid.  He is recovering well and is feeling better and physically seems to be doing well.  He broke down and cried several times while in the exam room today, demonstrating a lot of hysteria and anxiety around this issue.  I tried multiple times to reassure him that he will do well.  I offered him some lorazepam which initially he accepted and would take, but then once he got home he looked it up online and evidently found a problem with it and so he is not going to take it.  I offered him counseling and suggested that he may need some mental health to get through this.  - LORazepam (ATIVAN) 0.5 MG tablet; Take 1 tablet (0.5 mg) by mouth every 6 hours as needed for anxiety    Review of external notes as documented elsewhere in note  Prescription drug management  45 minutes spent on the date of the encounter doing chart review, review of outside records, review of test results, interpretation of tests, patient visit and documentation        BMI:   Estimated body mass index is 28.08 kg/m  as calculated from the following:    Height as of 7/7/21: 1.715 m (5' 7.52\").    Weight as of this encounter: 82.6 kg (182 lb 1.6 oz).           No follow-ups on file.    Turner Clement MD  [facility] " DEAN Moura is a 41 year old who presents for the following health issues     HPI     Patient comes in today with lots of questions about Covid.  He has had Covid and Covid pneumonia testing positive in the early part of December.  He recovered and his breathing is a lot better and he feels much better his energy is starting to come back and gradually is improving however he has tremendous anxiety I would say almost hysteria about Covid and complications that he may or may not get going forward.  This is actually disabling him and he is having focused thoughts of this is all he can think about is his Covid diagnosis and how he thinks he is going to die from this still.      Review of Systems   Constitutional, HEENT, cardiovascular, pulmonary, gi and gu systems are negative, except as otherwise noted.      Objective    /60 (BP Location: Left arm, Patient Position: Sitting, Cuff Size: Adult Large)   Pulse 111   Wt 82.6 kg (182 lb 1.6 oz)   SpO2 96%   BMI 28.08 kg/m    Body mass index is 28.08 kg/m .  Physical Exam   GENERAL: healthy, alert and no distress  NECK: no adenopathy, no asymmetry, masses, or scars and thyroid normal to palpation  RESP: lungs clear to auscultation - no rales, rhonchi or wheezes  CV: regular rate and rhythm, normal S1 S2, no S3 or S4, no murmur, click or rub, no peripheral edema and peripheral pulses strong  ABDOMEN: soft, nontender, no hepatosplenomegaly, no masses and bowel sounds normal  MS: no gross musculoskeletal defects noted, no edema

## 2022-01-08 ENCOUNTER — HEALTH MAINTENANCE LETTER (OUTPATIENT)
Age: 42
End: 2022-01-08

## 2022-01-10 ENCOUNTER — OFFICE VISIT (OUTPATIENT)
Dept: FAMILY MEDICINE | Facility: CLINIC | Age: 42
End: 2022-01-10
Payer: COMMERCIAL

## 2022-01-10 VITALS
SYSTOLIC BLOOD PRESSURE: 116 MMHG | BODY MASS INDEX: 29.61 KG/M2 | HEART RATE: 110 BPM | OXYGEN SATURATION: 96 % | WEIGHT: 192 LBS | DIASTOLIC BLOOD PRESSURE: 70 MMHG

## 2022-01-10 DIAGNOSIS — F41.9 POST-COVID CHRONIC ANXIETY: Primary | ICD-10-CM

## 2022-01-10 DIAGNOSIS — S76.311A STRAIN OF RIGHT HAMSTRING, INITIAL ENCOUNTER: ICD-10-CM

## 2022-01-10 DIAGNOSIS — U09.9 POST-COVID CHRONIC ANXIETY: Primary | ICD-10-CM

## 2022-01-10 PROBLEM — S13.4XXA WHIPLASH INJURIES, INITIAL ENCOUNTER: Status: RESOLVED | Noted: 2018-08-09 | Resolved: 2022-01-10

## 2022-01-10 PROCEDURE — 99214 OFFICE O/P EST MOD 30 MIN: CPT | Performed by: FAMILY MEDICINE

## 2022-01-10 NOTE — TELEPHONE ENCOUNTER
Form filled out by Dr. Clement. Patient just needs to sign. Form copied and sent to scan. Original placed at  for patient to .   Glenna Cummings LPN

## 2022-01-11 NOTE — PROGRESS NOTES
"  Assessment & Plan     Post-COVID chronic anxiety    Again today we spent a lot of time discussing the questions that he has about his anxiety in regards to his COVID recovery.  I reassured him that again he is doing well and his breathing is comfortable and he is not hypoxic at all.  He should spend more time exercising and not watching TV or social media searching the Internet.  But also obviously if he has any concerns he can talk with us about them here to try to get him the most accurate up-to-date information to try to allay his anxiety.    Strain of right hamstring, initial encounter    I reassured him that I do not think that this is gout.  He has no redness or warmth.  He is mostly tender over the hamstring area on that right posterior knee as I think he had a hamstring pull, and I advised him to use ibuprofen or similar medications.  He can also use some ice today and then heat over the next several days.  Gentle stretching should be encouraged and follow-up if not improving.      Review of external notes as documented elsewhere in note  Prescription drug management  30 minutes spent on the date of the encounter doing chart review, patient visit and documentation        BMI:   Estimated body mass index is 29.61 kg/m  as calculated from the following:    Height as of 7/7/21: 1.715 m (5' 7.52\").    Weight as of this encounter: 87.1 kg (192 lb).           No follow-ups on file.    Turner Clement MD  [facility]    Jovan Moura is a 41 year old who presents for the following health issues     HPI     Patient is here today with some right knee pain.  He actually comes in on crutches.  He states that he was doing some activity and felt a pull in the back of his lower hamstring on that right leg.  It is pretty tender today.  He can walk on it.  It just is a little bit better if he uses the crutches.  No anterior pain, no redness or warmth or swelling.  No calf swelling and no " shortness of breath no worse than he has had.      Review of Systems   Constitutional, HEENT, cardiovascular, pulmonary, gi and gu systems are negative, except as otherwise noted.      Objective    /70 (BP Location: Left arm, Patient Position: Sitting, Cuff Size: Adult Large)   Pulse 110   Wt 87.1 kg (192 lb)   SpO2 96%   BMI 29.61 kg/m    Body mass index is 29.61 kg/m .  Physical Exam   GENERAL: healthy, alert and no distress  NECK: no adenopathy, no asymmetry, masses, or scars and thyroid normal to palpation  RESP: lungs clear to auscultation - no rales, rhonchi or wheezes  CV: regular rate and rhythm, normal S1 S2, no S3 or S4, no murmur, click or rub, no peripheral edema and peripheral pulses strong  ABDOMEN: soft, nontender, no hepatosplenomegaly, no masses and bowel sounds normal  MS: He has right posterior hamstring shows a bit of tenderness to palpation.  There is no obvious defect or tear there.  The knee otherwise is unremarkable.  Calf is also normal.

## 2022-01-17 ENCOUNTER — VIRTUAL VISIT (OUTPATIENT)
Dept: FAMILY MEDICINE | Facility: CLINIC | Age: 42
End: 2022-01-17
Payer: COMMERCIAL

## 2022-01-17 ENCOUNTER — OFFICE VISIT (OUTPATIENT)
Dept: FAMILY MEDICINE | Facility: CLINIC | Age: 42
End: 2022-01-17

## 2022-01-17 VITALS
SYSTOLIC BLOOD PRESSURE: 108 MMHG | WEIGHT: 190.8 LBS | OXYGEN SATURATION: 97 % | DIASTOLIC BLOOD PRESSURE: 70 MMHG | HEART RATE: 115 BPM | BODY MASS INDEX: 29.42 KG/M2

## 2022-01-17 DIAGNOSIS — F41.9 POST-COVID CHRONIC ANXIETY: ICD-10-CM

## 2022-01-17 DIAGNOSIS — J12.82 PNEUMONIA DUE TO 2019 NOVEL CORONAVIRUS: Primary | ICD-10-CM

## 2022-01-17 DIAGNOSIS — J18.9 PNEUMONIA OF BOTH LUNGS DUE TO INFECTIOUS ORGANISM, UNSPECIFIED PART OF LUNG: Primary | ICD-10-CM

## 2022-01-17 DIAGNOSIS — G47.30 SLEEP APNEA, UNSPECIFIED TYPE: ICD-10-CM

## 2022-01-17 DIAGNOSIS — U07.1 PNEUMONIA DUE TO 2019 NOVEL CORONAVIRUS: Primary | ICD-10-CM

## 2022-01-17 DIAGNOSIS — U09.9 POST-COVID CHRONIC ANXIETY: ICD-10-CM

## 2022-01-17 PROCEDURE — 99214 OFFICE O/P EST MOD 30 MIN: CPT | Performed by: FAMILY MEDICINE

## 2022-01-17 PROCEDURE — 99214 OFFICE O/P EST MOD 30 MIN: CPT | Mod: TEL | Performed by: FAMILY MEDICINE

## 2022-01-17 RX ORDER — DOXYCYCLINE 100 MG/1
100 CAPSULE ORAL 2 TIMES DAILY
Qty: 20 CAPSULE | Refills: 0 | Status: SHIPPED | OUTPATIENT
Start: 2022-01-17 | End: 2022-01-19

## 2022-01-17 NOTE — PROGRESS NOTES
"Martell is a 41 year old who is being evaluated via a billable telephone visit.      What phone number would you like to be contacted at? [phone]   How would you like to obtain your AVS? MyChart    Assessment & Plan      Patient presents with recent history of COVID-19 infection resulting in a double pneumonia for which she took azithromycin was placed on oxygen approximately 9 days ago.  He now has 4 days of worsening mucus production without cough or additional symptoms.  Prescribe doxycycline if symptoms worsen, but cautioned patient to only use this if he starts to feel worse.  In addition, I do believe that patient's oxygen saturation when he sleeps is particularly low and could be the result of his recent lung infections but also bears for screening.  Recommended sleep study when patient begins to feel better at least 1 month from now.  Attempted to provide reassurance to patient.    Pneumonia of both lungs due to infectious organism, unspecified part of lung  - doxycycline monohydrate (MONODOX) 100 MG capsule  Dispense: 20 capsule; Refill: 0    Sleep apnea, unspecified type  - SLEEP EVALUATION & MANAGEMENT REFERRAL - ADULT -         BMI:   Estimated body mass index is 29.61 kg/m  as calculated from the following:    Height as of 7/7/21: 1.715 m (5' 7.52\").    Weight as of 1/10/22: 87.1 kg (192 lb).       No follow-ups on file.    Jesusita Chaudhary MD  [facility] [place]    Jovan Moura is a 41 year old who presents for the following health issues    HPI     His symptoms began on 12/13. Patient was diagnosed with covid on 12/15 and bilateral pneumonia on 12/27. He was put on dexamethasone for 10 days, oxygen, and azithromycin. At that time he went from brown mucous, to absent mucous, to green mucous now. He is now experiencing green mucous production for the past 4 days. Patient is here today for a follow up. He has been doing deep breathing exercises. It usually occurs in the " morning. Aside from the productive mucous, he is satting 96% when moving and dips down to 91% when laying down. He is unsure if this is new.     He is unvaccinated. He used to be a smoker but quit this past year.         Objective           Vitals:  No vitals were obtained today due to virtual visit.    Physical Exam   n/a        Phone call duration: 27  minutes

## 2022-01-17 NOTE — LETTER
[facility]  [address]  [place] [address]  Phone: [phone]  Fax: [phone]    January 17, 2022        Martell Clement  [address] [address]          To whom it may concern:    RE: Martell Clement    It is my medical opinion that patient should abstain from working until after his in-person doctor's appointment in 10 days, 1/27.     Please contact me for questions or concerns.      Sincerely,        Jesusita Chaudhary MD

## 2022-01-19 NOTE — PROGRESS NOTES
"  Assessment & Plan     Pneumonia due to 2019 novel coronavirus    Again today we spent most of her time today discussing his questions and anxiety and concerns around COVID.  He was given these antibiotics by another provider and we talked about situations where I would suggest that he take them.  I do not think he needs them right now, but he is very concerned that he is going to turn around to get worse for some reason.  I told him what conditions I would have him take the antibiotics, and that would be for a fever or purulent sputum throughout the day not just in the morning.  If he gets worse then he will let me know or he will go ahead and take those antibiotics.      Post-COVID chronic anxiety        Prescription drug management  30 minutes spent on the date of the encounter doing chart review, patient visit and documentation        BMI:   Estimated body mass index is 29.42 kg/m  as calculated from the following:    Height as of 7/7/21: 1.715 m (5' 7.52\").    Weight as of this encounter: 86.5 kg (190 lb 12.8 oz).           No follow-ups on file.    Turner Clement MD  [employer]    Jovan Moura is a 41 year old who presents for the following health issues     HPI     Patient is here again today with anxiety about COVID and his course after having COVID.  He continues to be anxious all of the time about his condition.  He constantly monitors his sputum production and he shows me 10 or 12 pictures of various sputum production episodes today.  He has no fevers or chills he is not short of breath he is not wheezing.  He has no other symptoms.      Review of Systems   Constitutional, HEENT, cardiovascular, pulmonary, gi and gu systems are negative, except as otherwise noted.      Objective    /70 (BP Location: Left arm, Patient Position: Sitting, Cuff Size: Adult Large)   Pulse 115   Wt 86.5 kg (190 lb 12.8 oz)   SpO2 97%   BMI 29.42 kg/m    Body mass index is 29.42 " kg/m .  Physical Exam   GENERAL: healthy, alert and no distress  NECK: no adenopathy, no asymmetry, masses, or scars and thyroid normal to palpation  RESP: lungs clear to auscultation - no rales, rhonchi or wheezes  CV: regular rate and rhythm, normal S1 S2, no S3 or S4, no murmur, click or rub, no peripheral edema and peripheral pulses strong  ABDOMEN: soft, nontender, no hepatosplenomegaly, no masses and bowel sounds normal  MS: no gross musculoskeletal defects noted, no edema

## 2022-01-21 ENCOUNTER — MYC MEDICAL ADVICE (OUTPATIENT)
Dept: INTERNAL MEDICINE | Facility: CLINIC | Age: 42
End: 2022-01-21

## 2022-01-21 ENCOUNTER — OFFICE VISIT (OUTPATIENT)
Dept: INTERNAL MEDICINE | Facility: CLINIC | Age: 42
End: 2022-01-21
Payer: COMMERCIAL

## 2022-01-21 VITALS
WEIGHT: 190 LBS | SYSTOLIC BLOOD PRESSURE: 142 MMHG | OXYGEN SATURATION: 99 % | BODY MASS INDEX: 28.79 KG/M2 | DIASTOLIC BLOOD PRESSURE: 92 MMHG | RESPIRATION RATE: 18 BRPM | HEIGHT: 68 IN | TEMPERATURE: 98 F | HEART RATE: 110 BPM

## 2022-01-21 DIAGNOSIS — H69.93 DYSFUNCTION OF BOTH EUSTACHIAN TUBES: Primary | ICD-10-CM

## 2022-01-21 PROCEDURE — 99213 OFFICE O/P EST LOW 20 MIN: CPT | Performed by: INTERNAL MEDICINE

## 2022-01-21 RX ORDER — PREDNISONE 20 MG/1
40 TABLET ORAL DAILY
Qty: 10 TABLET | Refills: 0 | Status: SHIPPED | OUTPATIENT
Start: 2022-01-21 | End: 2022-01-28

## 2022-01-21 ASSESSMENT — PAIN SCALES - GENERAL: PAINLEVEL: NO PAIN (0)

## 2022-01-21 ASSESSMENT — MIFFLIN-ST. JEOR: SCORE: 1743.39

## 2022-01-21 NOTE — PROGRESS NOTES
"      Jovan Moura is a 41 year old who presents for the following health issues     HPI     Chief Complaint   Patient presents with     Ear Problem     Right ear pain since 01/8/22, feels wet, pain scale 1/10             EMR reviewed including:             Complaint, History of Chief Complaint, Corresponding Review of Systems, and Complaint Specific Physical Examination.    #1   Pressure in the right ear.  Denies fevers or chills.  Slight sensation of wetness in the ear.  Minimal discomfort.  Slight decrease in hearing.  Contralateral ear unaffected.        Exam:   ENT: Pharynx is non-erythemous, minimal PND, no significant nasal obstruction, TM's not red but considerably retracted bilaterally, hearing slightly decreased bilaterally. No carotid bruits are heard. No JVD seen. Thyroid is not nodular or enlarged.   LUNGS: clear bilaterally, airflow is brisk, no intercostal retraction or stridor is noted. No coughing is noted during visit.   HEART:  regular without rubs, clicks, gallops, or murmurs. PMI is nondisplaced. Upstrokes are brisk. S1,S2 are heard.        Patient has been interviewed, applicable history and applied review of systems have been performed.    Vital Signs:   BP (!) 142/92 (BP Location: Right arm, Patient Position: Sitting, Cuff Size: Adult Large)   Pulse 110   Temp 98  F (36.7  C) (Temporal)   Resp 18   Ht 1.715 m (5' 7.5\")   Wt 86.2 kg (190 lb)   SpO2 99%   BMI 29.32 kg/m        Decision Making    Problem and Complexity     1. Dysfunction of both eustachian tubes  Tried cortisone plus antihistamine decongestant.  Patient notes home blood pressures are normal, slightly elevated today due to him driving across the [facility] area just to see me at this clinic.  - predniSONE (DELTASONE) 20 MG tablet; Take 2 tablets (40 mg) by mouth daily  Dispense: 10 tablet; Refill: 0  - loratadine-pseudoePHEDrine (CLARITIN-D 12-HOUR) 5-120 MG 12 hr tablet; 1 pill once or twice a day.  " Dispense: 14 tablet; Refill: 0                                FOLLOW UP   I have asked the patient to make an appointment for followup with his primary care provider as scheduled for his ongoing health care, examinations, educations, vaccinations, and trials and tribulations.        I have carefully explained the diagnosis and treatment options to the patient.  The patient has displayed an understanding of the above, and all subsequent questions were answered.      DO ARELI Villaneuva    Portions of this note were produced using [employer]  Although every attempt at real-time proof reading has been made, occasional grammar/syntax errors may have been missed.

## 2022-01-21 NOTE — PROGRESS NOTES
{PROVIDER CHARTING PREFERENCE:883937}    Jovan Moura is a 41 year old who presents for the following health issues     HPI     Chief Complaint   Patient presents with     Ear Problem     {additonal problems for provider to add (Optional):311114}    Review of Systems   {ROS COMP (Optional):543364}      Objective    There were no vitals taken for this visit.  There is no height or weight on file to calculate BMI.  Physical Exam   {Exam List (Optional):411008}    {Diagnostic Test Results (Optional):772060}    {AMBULATORY ATTESTATION (Optional):415286}

## 2022-01-24 ENCOUNTER — OFFICE VISIT (OUTPATIENT)
Dept: FAMILY MEDICINE | Facility: CLINIC | Age: 42
End: 2022-01-24
Payer: COMMERCIAL

## 2022-01-24 ENCOUNTER — MYC MEDICAL ADVICE (OUTPATIENT)
Dept: FAMILY MEDICINE | Facility: CLINIC | Age: 42
End: 2022-01-24

## 2022-01-24 VITALS
DIASTOLIC BLOOD PRESSURE: 84 MMHG | HEART RATE: 100 BPM | HEIGHT: 68 IN | OXYGEN SATURATION: 96 % | SYSTOLIC BLOOD PRESSURE: 118 MMHG | BODY MASS INDEX: 28.02 KG/M2 | RESPIRATION RATE: 12 BRPM | WEIGHT: 184.9 LBS | TEMPERATURE: 98.7 F

## 2022-01-24 DIAGNOSIS — H69.90 DYSFUNCTION OF EUSTACHIAN TUBE, UNSPECIFIED LATERALITY: Primary | ICD-10-CM

## 2022-01-24 DIAGNOSIS — R03.0 ELEVATED BP WITHOUT DIAGNOSIS OF HYPERTENSION: Primary | ICD-10-CM

## 2022-01-24 DIAGNOSIS — H92.01 RIGHT EAR PAIN: ICD-10-CM

## 2022-01-24 PROBLEM — M1A.09X1 IDIOPATHIC CHRONIC GOUT OF MULTIPLE SITES WITH TOPHUS: Status: ACTIVE | Noted: 2022-01-24

## 2022-01-24 PROCEDURE — 99214 OFFICE O/P EST MOD 30 MIN: CPT | Performed by: FAMILY MEDICINE

## 2022-01-24 ASSESSMENT — MIFFLIN-ST. JEOR: SCORE: 1720.26

## 2022-01-24 NOTE — ASSESSMENT & PLAN NOTE
Most likely secondary to current healing process.  Also worried that his home blood pressure cuff is not providing accurate blood pressure reading.  Offered to have patient bring in blood pressure monitor to have compared against blood pressures in clinic.    Also discussed the role of diet, and routine exercise as first-line treatment for elevated blood pressure.  Information to online resources including the video 23-1/2 hours by Dr. Lalo Ospina given for review.    Continue to check home blood pressure.  If still getting high readings at home over the next 2 to 3 weeks, and then come in for nurse visit for blood pressure check along with verification of home machine.

## 2022-01-24 NOTE — PROGRESS NOTES
Problem List Items Addressed This Visit        Other    Elevated BP without diagnosis of hypertension - Primary     Most likely secondary to current healing process.  Also worried that his home blood pressure cuff is not providing accurate blood pressure reading.  Offered to have patient bring in blood pressure monitor to have compared against blood pressures in clinic.    Also discussed the role of diet, and routine exercise as first-line treatment for elevated blood pressure.  Information to online resources including the video 23-1/2 hours by Dr. Lalo Ospina given for review.    Continue to check home blood pressure.  If still getting high readings at home over the next 2 to 3 weeks, and then come in for nurse visit for blood pressure check along with verification of home machine.           Other Visit Diagnoses     Right ear pain        Discussed using a coolmist humidifier as well as a Annabelle pot versus taking the Claritin or prednisone as previously prescribed.        Return in about 3 months (around 4/24/2022) for Blood pressure recheck.    Jovan Moura is a 41 year old who presents for the following health issues   Elevated blood pressure.  Patient was diagnosed with Covid pneumonia just over a month ago.  Action required home oxygen with pneumonia.  Overall he has been recovering as expected.  However he was having some right ear pain and so he went in to clinic to have it evaluated.  They did diagnose him with eustachian tube defect as well as post viral and recommended using Claritin with Sudafed as well as prednisone to help clear the ear pain.  Patient noted that his blood pressure was slightly elevated while in clinic so while he was at the pharmacy he picked up a blood pressure cuff.  At home he has been getting readings in the 150s over 90s.  He has been doing it while resting.  In clinic today his blood pressure is significantly lower.  Even the blood pressure taken at the acute care visit  "was in the 142/92 range.  Reviewed his previous blood pressures in clinic which have of almost always been below 120/80.  Discussed with patient that it could actually be a faulty monitor not giving accurate reading, and could also be a portion of still recovering from his recent pneumonia.  Also of note discussed how anxiety, stress and change in normal routines can cause mild elevations in blood pressure.  Also discussed that we do a blood pressure mainly to prevent long-term effects.  He is not having any readings that are worrisome for immediate effects from blood pressure.  Reassurance given that with time I expect his blood pressure to come back down especially with him getting back to his normal routines and exercise schedule.  Also discussed that the #1 treatment plan for reduction in blood pressure is actually lifestyle modifications and not medications which can have their own side effects as well.       Review of Systems   All other systems reviewed and are negative.           Objective    /84 (BP Location: Left arm, Patient Position: Sitting, Cuff Size: Adult Large)   Pulse 100   Temp 98.7  F (37.1  C) (Oral)   Resp 12   Ht 1.715 m (5' 7.5\")   Wt 83.9 kg (184 lb 14.4 oz)   SpO2 96%   BMI 28.53 kg/m    Body mass index is 28.53 kg/m .  Physical Exam  Vitals and nursing note reviewed.   Constitutional:       General: He is not in acute distress.     Appearance: Normal appearance. He is not ill-appearing.   HENT:      Head: Normocephalic and atraumatic.      Right Ear: Ear canal and external ear normal.      Left Ear: Tympanic membrane, ear canal and external ear normal.      Ears:      Comments: Clear fluid behind the right tympanic membrane.  Tympanic membrane otherwise mobile with no erythema  Eyes:      Extraocular Movements: Extraocular movements intact.      Conjunctiva/sclera: Conjunctivae normal.   Cardiovascular:      Rate and Rhythm: Normal rate and regular rhythm.      Pulses: Normal " pulses.      Heart sounds: Normal heart sounds. No murmur heard.  No friction rub. No gallop.    Pulmonary:      Effort: Pulmonary effort is normal.      Breath sounds: Normal breath sounds. No wheezing, rhonchi or rales.   Musculoskeletal:      Right lower leg: No edema.      Left lower leg: No edema.   Skin:     Capillary Refill: Capillary refill takes less than 2 seconds.   Neurological:      Mental Status: He is alert and oriented to person, place, and time.   Psychiatric:         Attention and Perception: Attention normal.         Mood and Affect: Mood normal.         Speech: Speech normal.         Thought Content: Thought content normal.

## 2022-01-28 ENCOUNTER — OFFICE VISIT (OUTPATIENT)
Dept: FAMILY MEDICINE | Facility: CLINIC | Age: 42
End: 2022-01-28
Payer: COMMERCIAL

## 2022-01-28 VITALS
DIASTOLIC BLOOD PRESSURE: 100 MMHG | BODY MASS INDEX: 28.55 KG/M2 | SYSTOLIC BLOOD PRESSURE: 151 MMHG | HEART RATE: 100 BPM | WEIGHT: 185 LBS | RESPIRATION RATE: 16 BRPM

## 2022-01-28 DIAGNOSIS — H92.01 RIGHT EAR PAIN: ICD-10-CM

## 2022-01-28 DIAGNOSIS — R03.0 ELEVATED BP WITHOUT DIAGNOSIS OF HYPERTENSION: ICD-10-CM

## 2022-01-28 PROCEDURE — 99213 OFFICE O/P EST LOW 20 MIN: CPT | Performed by: FAMILY MEDICINE

## 2022-01-28 RX ORDER — FLUTICASONE PROPIONATE 50 MCG
1 SPRAY, SUSPENSION (ML) NASAL DAILY
Qty: 48 G | Refills: 3 | Status: SHIPPED | OUTPATIENT
Start: 2022-01-28 | End: 2022-03-03

## 2022-01-28 NOTE — PATIENT INSTRUCTIONS
To see ENT for eval    Three nurse BP checks done one week apart.  If these are all elevated then follow up in clinic with your PCP.    Avoid salt in your diet.

## 2022-01-28 NOTE — PROGRESS NOTES
DIAGNOSIS:  Encounter Diagnoses   Name Primary?     Right ear pain, related to old injury.      Elevated BP without diagnosis of hypertension         PLAN:    To see ENT for eval    Three nurse BP checks done one week apart.  If these are all elevated then follow up in clinic with your PCP.    Avoid salt in your diet.        HPI:  8:19am  Had Covid 12-15-22. Did well.  Got a right earache on 1-18-22.  Initially it was a wet and a fullhess feeling but no headache.  Saw a doc in [place] 1-24-22 and BP was high.  Pt was prescribed claritin-D and prednisone.  Pt did not take this because of the BP elevation.  Would like to see ENT and is requesting a referral.  Feels a dizzy-woozy/ lightheaded  feeling in the head.  No fever or cough.  Hearing is normal and not muffled        Current Outpatient Medications   Medication     allopurinol (ZYLOPRIM) 300 MG tablet     No current facility-administered medications for this visit.       Pmh: reviewed  Psh: reviewed  Allergy:  reviewed      EXAM:    BP (!) 151/100   Pulse 100   Resp 16   Wt 83.9 kg (185 lb)   BMI 28.55 kg/m    GEN:   ALERT, NAD, ORIENTED TIMES THREE  HEENT: TMS NL, PERRL, THR CLEAR  NECK: SUPPLE WITHOUT ADENOPATHY OR THYROMEGALY  LUNGS: CTA  COR: RRR WITHOUT MURMUR  EXT: WITHOUT EDEMA/SWELLING  NEURO:  GAIT IS NORMAL AND ROMBERG IS NEGATIVE

## 2022-01-28 NOTE — TELEPHONE ENCOUNTER
Problem List Items Addressed This Visit     None      Visit Diagnoses     Dysfunction of Eustachian tube, unspecified laterality    -  Primary    Relevant Medications    fluticasone (FLONASE) 50 MCG/ACT nasal spray

## 2022-02-02 ENCOUNTER — OFFICE VISIT (OUTPATIENT)
Dept: AUDIOLOGY | Facility: CLINIC | Age: 42
End: 2022-02-02
Payer: COMMERCIAL

## 2022-02-02 DIAGNOSIS — H90.5 SENSORINEURAL HEARING LOSS (SNHL), UNSPECIFIED LATERALITY: Primary | ICD-10-CM

## 2022-02-02 DIAGNOSIS — H93.11 TINNITUS OF RIGHT EAR: Primary | ICD-10-CM

## 2022-02-02 PROCEDURE — 92557 COMPREHENSIVE HEARING TEST: CPT | Performed by: AUDIOLOGIST

## 2022-02-02 PROCEDURE — 92550 TYMPANOMETRY & REFLEX THRESH: CPT | Mod: 52 | Performed by: AUDIOLOGIST

## 2022-02-02 NOTE — PROGRESS NOTES
AUDIOLOGY REPORT    SUMMARY: Audiology visit completed. See audiogram for results.      RECOMMENDATIONS: Follow-up with ENT. Patient may cancel his ENT appointment if symptoms have resolved.    Víctor Tamez, CCC-A  Clinical Audiologist  [place] #[id number]        .

## 2022-02-08 NOTE — PROGRESS NOTES
"[facility] COMPLAINT:  Ear Concern      HISTORY OF PRESENT ILLNESS    Martell was seen at the behest of [facility] for RIGHT EAR FULLNESS        AUDIOLOGY NOTE:    HX: Dr. Washington. In Dec 2021 Pt. had Covid on Dec 15th then had double pneumonia in lungs on Dec 27th. When he had pneumonia, he fell  down the stairs and hit his head. After the pneumonia, he then had fluid in the right ear but it was not infected. He got a 2nd opinion and  that provider told him that he had no fluid in the right ear. Since Jan 18th, he right ear feels full and moist. Historically, he has experienced  right tinnitus and it has recently worsened. No dizziness, but his head feels \"spacey.\" The \"spacey\" feeling inside his head decreases when  he covers his ears. Thinks he may have brain fog from Covid. He reports that his symptoms are improving as the days go on, but he would  like a second opinion. Denies otalgia, otorrhea, vertigo, ear surgery, family hx, and noise exposure.  RESULTS: Tymps: Type A bilaterally. Ipsi reflexes at 1kHz are present at normal levels bilaterally. Audio: normal hearing sensitivity  bilaterally. Excellent word rec bilaterally.  REC: Follow up with ENT. May cancel ENT appointment if symptoms have resolved.     REVIEW OF SYSTEMS    Review of Systems as per HPI and PMHx, otherwise 10 system review system are negative.     Patient has no known allergies.     There were no vitals taken for this visit.    HEAD: Normal appearance and symmetry:  No cutaneous lesions.      NECK:  supple     EARS: normal TM, EACs     NOSE:     Dorsum:   straight  Septum:  midline  Mucosa:  moist  Inferior turbinates:  wnl        ORAL CAVITY/OROPHARYNX:     Lips:  Normal.  Tongue: normal, midline  Mucosa:   no lesions  Tonsils:  1+     NECK:  Trachea:  midline.              Thyroid:  normal              Adenopathy:  none        NEURO:   Alert and Oriented     GAIT AND STATION:  normal     RESPIRATORY:   Symmetry and Respiratory effort     PSYCH:  " Normal mood and affect     SKIN:   warm and dry         IMPRESSION:    Encounter Diagnoses   Name Primary?     Normal hearing noted on examination Yes     Ear fullness, right      Tinnitus, right           RECOMMENDATIONS:      Orders Placed This Encounter   Procedures     Adult Audiology Referral        ABR test to rule out CP angle lesion  Follow up via myChart

## 2022-02-09 ENCOUNTER — OFFICE VISIT (OUTPATIENT)
Dept: OTOLARYNGOLOGY | Facility: CLINIC | Age: 42
End: 2022-02-09
Attending: FAMILY MEDICINE
Payer: COMMERCIAL

## 2022-02-09 DIAGNOSIS — H93.11 TINNITUS, RIGHT: ICD-10-CM

## 2022-02-09 DIAGNOSIS — H93.8X1 EAR FULLNESS, RIGHT: ICD-10-CM

## 2022-02-09 DIAGNOSIS — Z01.10 NORMAL HEARING NOTED ON EXAMINATION: Primary | ICD-10-CM

## 2022-02-09 PROCEDURE — 99203 OFFICE O/P NEW LOW 30 MIN: CPT | Performed by: OTOLARYNGOLOGY

## 2022-02-09 NOTE — PATIENT INSTRUCTIONS
Patient Education     Tinnitus (Ringing in the Ears)     Treatment may include maskers and hearing aids.   Tinnitus is the term for a noise in your ear not caused by an outside sound. The noise might be a ringing, clicking, hiss, or roar. It can vary in pitch. It may be soft or very loud. For some people, this is a minor problem. But for others, the noise can make it hard to hear, work, and even sleep. When tinnitus can't be cured, treatments may help.  What causes tinnitus?  Loud noises, hearing loss, and earwax can cause tinnitus. So can certain medicines. Large amounts of aspirin or caffeine are sometimes to blame. In many cases, the exact cause of tinnitus is not known.  How is tinnitus treated?  Finding and removing the cause is the best way to treat tinnitus. So your healthcare provider may refer you to an ear, nose, and throat doctor (ENT or otolaryngologist). Your hearing may also be checked by a hearing specialist (audiologist). If you have hearing loss, wearing a hearing aid may help your tinnitus. When the cause can't be found, the tinnitus itself may be treated. Some of the treatments are listed below. Your healthcare provider can tell you more about them:    Maskers. These are small devices that look like hearing aids. They have a pleasant sound that helps cover up the ringing in your ears. Hearing aids and maskers are sometimes used together.    Medicines that treat anxiety and depression. These may ease tinnitus in some people.    Hypnosis or relaxation therapy. This may help head noise seem less severe.    Tinnitus retraining therapy. This combines counseling and maskers. Both can help take your mind off the tinnitus.  To learn more    American Speech-Hearing-Language Association [phone] www.deisy.org    American Tinnitus Association 936-788-6205 www.jose armando.org    National [place] on Deafness and other Communication Disorders 046-686-7932 www.nidcd.nih.gov/    [employer] last reviewed this  educational content on 9/1/2019 2000-2021 [employer]. All rights reserved. This information is not intended as a substitute for professional medical care. Always follow your healthcare professional's instructions.

## 2022-02-09 NOTE — LETTER
"    2/9/2022         RE: Martell Clement  [address] [address]        Dear Colleague,    Thank you for referring your patient, Martell Clement, to the [facility]. Please see a copy of my visit note below.    [facility]:  [facility]      HISTORY OF PRESENT ILLNESS    Martell was seen at the behest of [facility] for RIGHT EAR FULLNESS        AUDIOLOGY NOTE:    HX: Dr. Washington. In Dec 2021 Pt. had Covid on Dec 15th then had double pneumonia in lungs on Dec 27th. When he had pneumonia, he fell  down the stairs and hit his head. After the pneumonia, he then had fluid in the right ear but it was not infected. He got a 2nd opinion and  that provider told him that he had no fluid in the right ear. Since Jan 18th, he right ear feels full and moist. Historically, he has experienced  right tinnitus and it has recently worsened. No dizziness, but his head feels \"spacey.\" The \"spacey\" feeling inside his head decreases when  he covers his ears. Thinks he may have brain fog from Covid. He reports that his symptoms are improving as the days go on, but he would  like a second opinion. Denies otalgia, otorrhea, vertigo, ear surgery, family hx, and noise exposure.  RESULTS: Tymps: Type A bilaterally. Ipsi reflexes at 1kHz are present at normal levels bilaterally. Audio: normal hearing sensitivity  bilaterally. Excellent word rec bilaterally.  REC: Follow up with ENT. May cancel ENT appointment if symptoms have resolved.     REVIEW OF SYSTEMS    Review of Systems as per HPI and PMHx, otherwise 10 system review system are negative.     Patient has no known allergies.     There were no vitals taken for this visit.    HEAD: Normal appearance and symmetry:  No cutaneous lesions.      NECK:  supple     EARS: normal TM, EACs     NOSE:     Dorsum:   straight  Septum:  midline  Mucosa:  moist  Inferior turbinates:  wnl        ORAL CAVITY/OROPHARYNX:     Lips:  Normal.  Tongue: normal, " midline  Mucosa:   no lesions  Tonsils:  1+     NECK:  Trachea:  midline.              Thyroid:  normal              Adenopathy:  none        NEURO:   Alert and Oriented     GAIT AND STATION:  normal     RESPIRATORY:   Symmetry and Respiratory effort     PSYCH:  Normal mood and affect     SKIN:   warm and dry         IMPRESSION:    Encounter Diagnoses   Name Primary?     Normal hearing noted on examination Yes     Ear fullness, right      Tinnitus, right           RECOMMENDATIONS:      Orders Placed This Encounter   Procedures     Adult Audiology Referral        ABR test to rule out CP angle lesion  Follow up via myChart      Again, thank you for allowing me to participate in the care of your patient.        Sincerely,        Indra Washington MD

## 2022-03-03 ENCOUNTER — OFFICE VISIT (OUTPATIENT)
Dept: DERMATOLOGY | Facility: CLINIC | Age: 42
End: 2022-03-03
Payer: COMMERCIAL

## 2022-03-03 ENCOUNTER — TELEPHONE (OUTPATIENT)
Dept: AUDIOLOGY | Facility: CLINIC | Age: 42
End: 2022-03-03

## 2022-03-03 DIAGNOSIS — L21.9 DERMATITIS, SEBORRHEIC: ICD-10-CM

## 2022-03-03 DIAGNOSIS — L81.7 PIGMENTED PURPURIC DERMATOSIS: Primary | ICD-10-CM

## 2022-03-03 PROCEDURE — 99213 OFFICE O/P EST LOW 20 MIN: CPT | Performed by: DERMATOLOGY

## 2022-03-03 ASSESSMENT — PAIN SCALES - GENERAL: PAINLEVEL: NO PAIN (0)

## 2022-03-03 NOTE — NURSING NOTE
Martell Clement's chief complaint for this visit includes:  Chief Complaint   Patient presents with     Rash     Right lower leg rash starting after wearing an ace bandage.  Has been present for 2 days.      PCP: Turner Clement    Referring Provider:  Referred Self, MD  No address on file    There were no vitals taken for this visit.  No Pain (0)      No Known Allergies      Do you need any medication refills at today's visit? No    Diana Gutiérrez CMA

## 2022-03-03 NOTE — PROGRESS NOTES
[facility] Dermatology Note  Encounter Date: Mar 3, 2022  Office Visit     Dermatology Problem List:  1. Nail psoriasis  - Previous nb UVB, no current tx  2. Pigmented purpuric dermatosis  3. Subtle facial seb derm  - HC 1% cream or OTC antidandruff shampoo as face wash    Family history: Dad with psoriasis.  ____________________________________________    Assessment & Plan:    # Pigmented Purpuric Dermatosis, right lower leg. Self limited problem.  - Discussed the etiology of this diagnosis. Patient reassured of benign nature. Reviewed this should resolve on its own. Typically happens with prolonged standing or pressure changes (like from the ACE bandage).  - Recommended daily compression stockings for prevention. Discussed around 20mmHg.    # Subtle ?seborrheic dermatitis. No active rash on exam today. Clinical history fitting.  - Recommended OTC anti-dandruff shampoo as face wash or OTC hydrocortisone intermittently for falres.    Procedures Performed:   None.    Follow-up: prn for new or changing lesions    Staff and Scribe:     Scribe Disclosure:   I, Nadira Ballard, am serving as a scribe to document services personally performed by this physician, Dr. Diana Rangel, based on data collection and the provider's statements to me.     Provider Disclosure:   The documentation recorded by the scribe accurately reflects the services I personally performed and the decisions made by me.    Diana Rangel MD    Department of Dermatology  [facility]: Phone: [phone], Fax:[phone]  [facility]: Phone: [phone], Fax: [phone]    ____________________________________________    CC: Rash (Right lower leg rash starting after wearing an ace bandage.  Has been present for 2 days. )    HPI:  Mr. Martell Clement is a(n) 41 year old male who presents today as a return  "patient for rash.    Last seen 4/20/21 by Dr. Steele. Discussed options of methotrexate and other biologics. Plan was to have patient do more research then reach out with which option he would like to pursue. Numerous labs were also drawn (TB quant, Hep B core, Hep C, CMP, and CBC with diff).    Today, Martell notes wearing an ace bandage on the right knee and socks. A rash appeared at the exposed skin between the ace bandage and sock. His father has a history of psoriasis. Wondering if this is related to psoriasis or cardiovascular. He also reports that when he shaves it appears like a \"nicole-aid\" mustache. Does not believe this is psoriasis. Wondering if this is eczema.    Patient is otherwise feeling well, without additional skin concerns.    Labs Reviewed:    Component      Latest Ref Rng & Units 4/20/2021   Sodium      133 - 144 mmol/L 140   Potassium      3.4 - 5.3 mmol/L 4.4   Chloride      94 - 109 mmol/L 108   Carbon Dioxide      20 - 32 mmol/L 29   Anion Gap      3 - 14 mmol/L 3   Glucose      70 - 99 mg/dL 93   Urea Nitrogen      7 - 30 mg/dL 14   Creatinine      0.66 - 1.25 mg/dL 1.09   GFR Estimate      >60 mL/min/1.73:m2 85   GFR Estimate If Black      >60 mL/min/1.73:m2 >90   Calcium      8.5 - 10.1 mg/dL 9.4   Bilirubin Total      0.2 - 1.3 mg/dL 0.4   Albumin      3.4 - 5.0 g/dL 4.2   Protein Total      6.8 - 8.8 g/dL 7.9   Alkaline Phosphatase      40 - 150 U/L 96   ALT      0 - 70 U/L 48   AST      0 - 45 U/L 28   WBC      4.0 - 11.0 10e9/L 7.8   RBC Count      4.4 - 5.9 10e12/L 6.01 (H)   Hemoglobin      13.3 - 17.7 g/dL 18.5 (H)   Hematocrit      40.0 - 53.0 % 53.5 (H)   MCV      78 - 100 fl 89   MCH      26.5 - 33.0 pg 30.8   MCHC      31.5 - 36.5 g/dL 34.6   RDW      10.0 - 15.0 % 13.8   Platelet Count      150 - 450 10e9/L 227   MTB Quantiferon Result      NEG:Negative Negative   TB1 Ag minus Nil      IU/mL 0.00   TB2 Ag minus Nil      IU/mL 0.00   Mitogen minus Nil      IU/mL 9.98   Nil " Result      IU/mL 0.02   Hepatitis B Core Aydee      NR:Nonreactive Nonreactive   Hepatitis C Antibody      NR:Nonreactive Nonreactive     Physical Exam:  Vitals: There were no vitals taken for this visit.  SKIN: Focused examination of the the right lower leg was performed.  - There are blanching purple brown patches that are form the mid calf to the proximal part of the right upper leg.  - Left lower leg is clear.  - No other lesions of concern on areas examined.     Medications:  Current Outpatient Medications   Medication     allopurinol (ZYLOPRIM) 300 MG tablet     fluticasone (FLONASE) 50 MCG/ACT nasal spray     No current facility-administered medications for this visit.      Past Medical History:   Patient Active Problem List   Diagnosis     Sciatica     Psoriasis of nail     Idiopathic chronic gout of multiple sites with tophus     Elevated BP without diagnosis of hypertension     Past Medical History:   Diagnosis Date     Gout      Idiopathic chronic gout of multiple sites with tophus 1/24/2022        CC Referred Self, MD  No address on file on close of this encounter.

## 2022-03-03 NOTE — TELEPHONE ENCOUNTER
Contacted patient to remind him of upcoming [facility] appointment. Patient has decided to reschedule appointment at this time. Contacted scheduling team to have appt rescheduled.    -Idalia Rolon

## 2022-03-03 NOTE — LETTER
3/3/2022         RE: Martell Clement  [address] [place] [address]        Dear Colleague,    Thank you for referring your patient, Martell Clement, to the [facility]. Please see a copy of my visit note below.    [facility] Dermatology Note  Encounter Date: Mar 3, 2022  Office Visit     Dermatology Problem List:  1. Nail psoriasis  - Previous nb UVB, no current tx  2. Pigmented purpuric dermatosis  3. Subtle facial seb derm  - HC 1% cream or OTC antidandruff shampoo as face wash    Family history: Dad with psoriasis.  ____________________________________________    Assessment & Plan:    # Pigmented Purpuric Dermatosis, right lower leg. Self limited problem.  - Discussed the etiology of this diagnosis. Patient reassured of benign nature. Reviewed this should resolve on its own. Typically happens with prolonged standing or pressure changes (like from the ACE bandage).  - Recommended daily compression stockings for prevention. Discussed around 20mmHg.    # Subtle ?seborrheic dermatitis. No active rash on exam today. Clinical history fitting.  - Recommended OTC anti-dandruff shampoo as face wash or OTC hydrocortisone intermittently for falres.    Procedures Performed:   None.    Follow-up: prn for new or changing lesions    Staff and Scribe:     Scribe Disclosure:   I, Nadira Ballard, am serving as a scribe to document services personally performed by this physician, Dr. Diana Rangel, based on data collection and the provider's statements to me.     Provider Disclosure:   The documentation recorded by the scribe accurately reflects the services I personally performed and the decisions made by me.    Diana Rangel MD    Department of Dermatology  [facility]: Phone: [phone], Fax:[phone]  [facility]: Phone: [phone],  "Fax: [phone]    ____________________________________________    CC: Rash (Right lower leg rash starting after wearing an ace bandage.  Has been present for 2 days. )    HPI:  Mr. Martell Clement is a(n) 41 year old male who presents today as a return patient for rash.    Last seen 4/20/21 by Dr. Steele. Discussed options of methotrexate and other biologics. Plan was to have patient do more research then reach out with which option he would like to pursue. Numerous labs were also drawn (TB quant, Hep B core, Hep C, CMP, and CBC with diff).    Today, Martell notes wearing an ace bandage on the right knee and socks. A rash appeared at the exposed skin between the ace bandage and sock. His father has a history of psoriasis. Wondering if this is related to psoriasis or cardiovascular. He also reports that when he shaves it appears like a \"nicole-aid\" mustache. Does not believe this is psoriasis. Wondering if this is eczema.    Patient is otherwise feeling well, without additional skin concerns.    Labs Reviewed:    Component      Latest Ref Rng & Units 4/20/2021   Sodium      133 - 144 mmol/L 140   Potassium      3.4 - 5.3 mmol/L 4.4   Chloride      94 - 109 mmol/L 108   Carbon Dioxide      20 - 32 mmol/L 29   Anion Gap      3 - 14 mmol/L 3   Glucose      70 - 99 mg/dL 93   Urea Nitrogen      7 - 30 mg/dL 14   Creatinine      0.66 - 1.25 mg/dL 1.09   GFR Estimate      >60 mL/min/1.73:m2 85   GFR Estimate If Black      >60 mL/min/1.73:m2 >90   Calcium      8.5 - 10.1 mg/dL 9.4   Bilirubin Total      0.2 - 1.3 mg/dL 0.4   Albumin      3.4 - 5.0 g/dL 4.2   Protein Total      6.8 - 8.8 g/dL 7.9   Alkaline Phosphatase      40 - 150 U/L 96   ALT      0 - 70 U/L 48   AST      0 - 45 U/L 28   WBC      4.0 - 11.0 10e9/L 7.8   RBC Count      4.4 - 5.9 10e12/L 6.01 (H)   Hemoglobin      13.3 - 17.7 g/dL 18.5 (H)   Hematocrit      40.0 - 53.0 % 53.5 (H)   MCV      78 - 100 fl 89   MCH      26.5 - 33.0 pg 30.8   MCHC      31.5 - " 36.5 g/dL 34.6   RDW      10.0 - 15.0 % 13.8   Platelet Count      150 - 450 10e9/L 227   MTB Quantiferon Result      NEG:Negative Negative   TB1 Ag minus Nil      IU/mL 0.00   TB2 Ag minus Nil      IU/mL 0.00   Mitogen minus Nil      IU/mL 9.98   Nil Result      IU/mL 0.02   Hepatitis B Core Aydee      NR:Nonreactive Nonreactive   Hepatitis C Antibody      NR:Nonreactive Nonreactive     Physical Exam:  Vitals: There were no vitals taken for this visit.  SKIN: Focused examination of the the right lower leg was performed.  - There are blanching purple brown patches that are form the mid calf to the proximal part of the right upper leg.  - Left lower leg is clear.  - No other lesions of concern on areas examined.     Medications:  Current Outpatient Medications   Medication     allopurinol (ZYLOPRIM) 300 MG tablet     fluticasone (FLONASE) 50 MCG/ACT nasal spray     No current facility-administered medications for this visit.      Past Medical History:   Patient Active Problem List   Diagnosis     Sciatica     Psoriasis of nail     Idiopathic chronic gout of multiple sites with tophus     Elevated BP without diagnosis of hypertension     Past Medical History:   Diagnosis Date     Gout      Idiopathic chronic gout of multiple sites with tophus 1/24/2022        CC Referred Self, MD  No address on file on close of this encounter.      Again, thank you for allowing me to participate in the care of your patient.        Sincerely,        Diana Rangel MD

## 2022-03-03 NOTE — PATIENT INSTRUCTIONS
Compression stockings:   - I recommend using ones that are around 20mmHg      Things to look out for possible psoriatic arthritis:  - Stiffness in the morning that does not go away after 1 hour

## 2022-04-21 ENCOUNTER — OFFICE VISIT (OUTPATIENT)
Dept: FAMILY MEDICINE | Facility: CLINIC | Age: 42
End: 2022-04-21
Payer: COMMERCIAL

## 2022-04-21 ENCOUNTER — ANCILLARY PROCEDURE (OUTPATIENT)
Dept: GENERAL RADIOLOGY | Facility: CLINIC | Age: 42
End: 2022-04-21
Attending: PHYSICIAN ASSISTANT
Payer: COMMERCIAL

## 2022-04-21 VITALS
OXYGEN SATURATION: 96 % | BODY MASS INDEX: 28.34 KG/M2 | WEIGHT: 187 LBS | SYSTOLIC BLOOD PRESSURE: 140 MMHG | DIASTOLIC BLOOD PRESSURE: 88 MMHG | HEIGHT: 68 IN | HEART RATE: 79 BPM | TEMPERATURE: 98.9 F

## 2022-04-21 DIAGNOSIS — R05.9 COUGH: Primary | ICD-10-CM

## 2022-04-21 DIAGNOSIS — R05.9 COUGH: ICD-10-CM

## 2022-04-21 PROCEDURE — 71046 X-RAY EXAM CHEST 2 VIEWS: CPT | Performed by: RADIOLOGY

## 2022-04-21 PROCEDURE — 99214 OFFICE O/P EST MOD 30 MIN: CPT | Performed by: PHYSICIAN ASSISTANT

## 2022-04-21 RX ORDER — MULTIPLE VITAMINS W/ MINERALS TAB 9MG-400MCG
1 TAB ORAL DAILY
COMMUNITY
End: 2022-08-02

## 2022-04-21 ASSESSMENT — ENCOUNTER SYMPTOMS
DYSURIA: 0
HEMATURIA: 0
CONSTIPATION: 0
MYALGIAS: 1
DIZZINESS: 0
ARTHRALGIAS: 0
HEADACHES: 0
EYE PAIN: 0
PARESTHESIAS: 0
COUGH: 1
FREQUENCY: 0
JOINT SWELLING: 0
NAUSEA: 0
ABDOMINAL PAIN: 0
SHORTNESS OF BREATH: 0
FEVER: 0
WEAKNESS: 0
HEARTBURN: 0
DIARRHEA: 0
HEMATOCHEZIA: 0
CHILLS: 0
PALPITATIONS: 0
NERVOUS/ANXIOUS: 0
SORE THROAT: 1

## 2022-04-21 NOTE — PROGRESS NOTES
"Assessment & Plan   Cough  Patient with upper respiratory symptoms for the last several days. Afebrile here.  Other vitals stable.  Exam is benign. No red flag signs or symptoms.  Home COVID test negative x2.  Chest x-ray per patient request was unremarkable. Most likely this is a viral URI.  Will treat conservatively with over-the-counter medicines.   Return to clinic as needed over the next 1 to 2 weeks for any new, worsening or concerning symptoms.  - XR Chest 2 Views; Future     BMI:   Estimated body mass index is 28.86 kg/m  as calculated from the following:    Height as of this encounter: 1.715 m (5' 7.5\").    Weight as of this encounter: 84.8 kg (187 lb).     Return if symptoms worsen or fail to improve, for In-Clinic Visit.    LEATHA Quiñonez [facility]    Jovan Moura is a 42 year old who presents for the following health issues    HPI   Acute Illness  Acute illness concerns: Cough, congestion - neg for COVID  Onset/Duration: x new symptoms last Friday  Symptoms:  Fever: no  Chills/Sweats: no  Headache (location?): no  Sinus Pressure: YES  Conjunctivitis:  no  Ear Pain: no  Rhinorrhea: YES  Congestion: YES - yellow and tiny amount of blood this am  Sore Throat: no  Cough: YES-productive of yellow sputum  Wheeze: no  Decreased Appetite: no  Nausea: no  Vomiting: no  Diarrhea: YES - only one time  Dysuria/Freq.: no  Dysuria or Hematuria: no  Fatigue/Achiness: no  Sick/Strep Exposure: unknown  Therapies tried and outcome:     Review of Systems   See HPI      Objective    BP (!) 140/88 (BP Location: Right arm, Patient Position: Sitting, Cuff Size: Adult Large)   Pulse 79   Temp 98.9  F (37.2  C) (Tympanic)   Ht 1.715 m (5' 7.5\")   Wt 84.8 kg (187 lb)   SpO2 96%   BMI 28.86 kg/m    Body mass index is 28.86 kg/m .  Physical Exam   Constitutional: healthy, alert, and no distress  Head: Normocephalic. Atraumatic  Eyes: No conjunctival injection, sclera " anicteric  Cardiovascular: RRR. No murmurs, clicks, gallops, or rubs. No peripheral edema.   Respiratory: No resp distress. Lungs CTAB bilaterally.   Musculoskeletal: extremities normal- no gross deformities noted, and normal muscle tone  Skin: no suspicious lesions or rashes  Neurologic: Gait normal. CN 2-12 grossly intact  Psychiatric: mentation appears normal and affect normal/bright    Chest x-ray: Per my read there is no evidence of focal consolidation.

## 2022-04-21 NOTE — LETTER
April 21, 2022      Martell Clement  [address] [address]        To Whom It May Concern:    Martell Clement was seen in our clinic. Please excuse Martell from work today 4/21/22. He may return to work without restrictions.      Sincerely,        Jan Santos PA-C

## 2022-05-27 ENCOUNTER — OFFICE VISIT (OUTPATIENT)
Dept: INTERNAL MEDICINE | Facility: CLINIC | Age: 42
End: 2022-05-27
Payer: COMMERCIAL

## 2022-05-27 VITALS
DIASTOLIC BLOOD PRESSURE: 100 MMHG | TEMPERATURE: 98.5 F | OXYGEN SATURATION: 97 % | BODY MASS INDEX: 30.77 KG/M2 | SYSTOLIC BLOOD PRESSURE: 128 MMHG | WEIGHT: 199.4 LBS | HEART RATE: 88 BPM

## 2022-05-27 DIAGNOSIS — L40.9 PSORIASIS OF NAIL: ICD-10-CM

## 2022-05-27 DIAGNOSIS — M1A.09X1 IDIOPATHIC CHRONIC GOUT OF MULTIPLE SITES WITH TOPHUS: ICD-10-CM

## 2022-05-27 DIAGNOSIS — I10 ESSENTIAL HYPERTENSION, BENIGN: Primary | ICD-10-CM

## 2022-05-27 PROCEDURE — 99214 OFFICE O/P EST MOD 30 MIN: CPT | Performed by: INTERNAL MEDICINE

## 2022-05-27 RX ORDER — LOSARTAN POTASSIUM 50 MG/1
50 TABLET ORAL DAILY
Qty: 90 TABLET | Refills: 3 | Status: SHIPPED | OUTPATIENT
Start: 2022-05-27 | End: 2022-08-02

## 2022-05-27 NOTE — PATIENT INSTRUCTIONS
Symptom directed visit, but also we talked about a longer-term issue of high blood pressure  42-year-old man, who works in [employer], and is on his feet all day wearing work boots    Callus right fifth toe, medial aspect, and a friction point where his fifth toe rubs against his fourth toe  He tried using a nail clipper to debride some of the hyperkeratotic skin, and accidentally dug a little deep and triggered a small bleed, which subsequently stopped.  Is been no sign of infection  Looking at his toe today, I can see the callused skin, but no signs of bleeding or infection.    He has wide feet, and has been working on getting wider work boots.  I told him indeed he needs to try to reduce the mechanical friction that produces the callus.    Told him that in the future, he could use an over-the-counter corn remover such as Dr. Priest's brand, to soften the thickened proteinaceous callus and allow it to slough off.    Elevated blood pressure, probably does have essential hypertension.    His father went on blood pressure medicine in his 30s.  Martell 42 years old, and has had elevated blood pressure especially since he had a bad case of COVID-19 in December 2021.  He is overweight with body mass index of 30.8.    We had a detailed discussion about blood pressure, and I advised that he go ahead and start on medication, which can always be stopped down the road if he makes big lifestyle changes.  I think a good choice for him will be losartan starting at 50 mg once a day.  I told him that side effects are really quite rare on losartan.  He has normal kidney function shown on a metabolic panel from November 2021.    I suggested that he buy his own home blood pressure machine, and take measurements on his right upper arm.  The target is 120/80 measured at rest in the seated position after sitting quietly for at least 3 to 4 minutes.    He is also can work on weight control, reducing sodium in the diet, minimizing or even  avoiding alcohol, and getting plenty of physical activity which she already does.    Obesity with body mass next step 30.8.  He would like to lose 20 pounds this year I think that is a great idea.  I reminded him about the importance of eating slowly, controlling portion size, and identifying problem foods to curtail or eliminate such as starches, sweets, fried foods.    History of gout, maintained on allopurinol 300 mg a day which seems to be working well to keep the gout attacks away    Psoriatic nails, he has been working with a dermatologist, and is considering using Otezla    Slightly elevated ALT liver enzyme of 58 measured in November 2021 and May 2021, probably has a touch of fatty liver from being overweight.  His dermatologist also presented the option of methotrexate for psoriasis.  He would need to have his liver tests monitored closely, for example every 3 months for the first year, if he chooses to start methotrexate because of potential liver side effects from methotrexate    History of COVID-19 infection December 2021, severe illness with bilateral pneumonia.  He thinks he is made a decent recovery from that except for the elevated blood pressure.    Immunization History   Administered Date(s) Administered    COVID-19,PF,Moderna 03/07/2022, 04/05/2022    Flu, Unspecified 09/21/2019    HepB, Unspecified 04/05/2010, 06/05/2010, 10/07/2010, 10/13/2010    HepB-Adult 03/05/2010, 04/05/2010, 10/07/2010, 10/13/2010    TD (ADULT, 7+) 06/03/2006    Td (Adult), Adsorbed 10/01/2006    Td,adult,historic,unspecified 06/03/2006, 10/01/2006    Tdap (Adacel,Boostrix) 04/05/2016

## 2022-05-27 NOTE — PROGRESS NOTES
Office Visit - Follow Up   Martell Clement   42 year old male    Date of Visit: 5/27/2022    Chief Complaint   Patient presents with     Foot Problems     Right pinky toe - possible blister x 3 months        -------------------------------------------------------------------------------------------------------------------------  Assessment and Plan    Symptom directed visit, but also we talked about a longer-term issue of high blood pressure  42-year-old man, who works in [employer], and is on his feet all day wearing work boots    Callus right fifth toe, medial aspect, and a friction point where his fifth toe rubs against his fourth toe  He tried using a nail clipper to debride some of the hyperkeratotic skin, and accidentally dug a little deep and triggered a small bleed, which subsequently stopped.  Is been no sign of infection  Looking at his toe today, I can see the callused skin, but no signs of bleeding or infection.    He has wide feet, and has been working on getting wider work boots.  I told him indeed he needs to try to reduce the mechanical friction that produces the callus.    Told him that in the future, he could use an over-the-counter corn remover such as Dr. Priest's brand, to soften the thickened proteinaceous callus and allow it to slough off.    Elevated blood pressure, probably does have essential hypertension, starting losartan 50 mg once a day May 27, 2022  His father went on blood pressure medicine in his 30s.  Martell 42 years old, and has had elevated blood pressure especially since he had a bad case of COVID-19 in December 2021.  He is overweight with body mass index of 30.8.    We had a detailed discussion about blood pressure, and I advised that he go ahead and start on medication, which can always be stopped down the road if he makes big lifestyle changes.  I think a good choice for him will be losartan starting at 50 mg once a day.  I told him that side effects are really quite rare on  losartan.  He has normal kidney function shown on a metabolic panel from November 2021.    I suggested that he buy his own home blood pressure machine, and take measurements on his right upper arm.  The target is 120/80 measured at rest in the seated position after sitting quietly for at least 3 to 4 minutes.    He is also can work on weight control, reducing sodium in the diet, minimizing or even avoiding alcohol, and getting plenty of physical activity which she already does.    Obesity with body mass next step 30.8.  He would like to lose 20 pounds this year I think that is a great idea.  I reminded him about the importance of eating slowly, controlling portion size, and identifying problem foods to curtail or eliminate such as starches, sweets, fried foods.    History of gout, maintained on allopurinol 300 mg a day which seems to be working well to keep the gout attacks away    Psoriatic nails, he has been working with a dermatologist, and is considering using Otezla    Slightly elevated ALT liver enzyme of 58 measured in November 2021 and May 2021, probably has a touch of fatty liver from being overweight.  His dermatologist also presented the option of methotrexate for psoriasis.  He would need to have his liver tests monitored closely, for example every 3 months for the first year, if he chooses to start methotrexate because of potential liver side effects from methotrexate    History of COVID-19 infection December 2021, severe illness with bilateral pneumonia.  He thinks he is made a decent recovery from that except for the elevated blood pressure.    Immunization History   Administered Date(s) Administered     COVID-19,PF,Moderna 03/07/2022, 04/05/2022     Flu, Unspecified 09/21/2019     HepB, Unspecified 04/05/2010, 06/05/2010, 10/07/2010, 10/13/2010     HepB-Adult 03/05/2010, 04/05/2010, 10/07/2010, 10/13/2010     TD (ADULT, 7+) 06/03/2006     Td (Adult), Adsorbed 10/01/2006      Td,adult,historic,unspecified 06/03/2006, 10/01/2006     Tdap (Adacel,Boostrix) 04/05/2016       --------------------------------------------------------------------------------------------------------------------------  History of Present Illness  This 42 year old old    Symptom directed visit, but also we talked about a longer-term issue of high blood pressure  42-year-old man, who works in [employer], and is on his feet all day wearing work boots    Callus right fifth toe, medial aspect, and a friction point where his fifth toe rubs against his fourth toe  He tried using a nail clipper to debride some of the hyperkeratotic skin, and accidentally dug a little deep and triggered a small bleed, which subsequently stopped.  Is been no sign of infection  Looking at his toe today, I can see the callused skin, but no signs of bleeding or infection.    He has wide feet, and has been working on getting wider work boots.  I told him indeed he needs to try to reduce the mechanical friction that produces the callus.    Told him that in the future, he could use an over-the-counter corn remover such as Dr. Priest's brand, to soften the thickened proteinaceous callus and allow it to slough off.    Elevated blood pressure, probably does have essential hypertension, starting losartan 50 mg once a day May 27, 2022  His father went on blood pressure medicine in his 30s.  Martell 42 years old, and has had elevated blood pressure especially since he had a bad case of COVID-19 in December 2021.  He is overweight with body mass index of 30.8.    We had a detailed discussion about blood pressure, and I advised that he go ahead and start on medication, which can always be stopped down the road if he makes big lifestyle changes.  I think a good choice for him will be losartan starting at 50 mg once a day.  I told him that side effects are really quite rare on losartan.  He has normal kidney function shown on a metabolic panel from November  .    I suggested that he buy his own home blood pressure machine, and take measurements on his right upper arm.  The target is 120/80 measured at rest in the seated position after sitting quietly for at least 3 to 4 minutes.    He is also can work on weight control, reducing sodium in the diet, minimizing or even avoiding alcohol, and getting plenty of physical activity which she already does.      Wt Readings from Last 3 Encounters:   22 90.4 kg (199 lb 6.4 oz)   22 84.8 kg (187 lb)   22 83.9 kg (185 lb)     BP Readings from Last 3 Encounters:   22 (!) 128/100   22 (!) 140/88   22 (!) 151/100       ---------------------------------------------------------------------------------------------------------------------------    Medications, Allergies, Social, and Problem List   Current Outpatient Medications   Medication Sig Dispense Refill     allopurinol (ZYLOPRIM) 300 MG tablet Take 1 tablet by mouth once daily 90 tablet 3     multivitamin w/minerals (THERA-VIT-M) tablet Take 1 tablet by mouth daily (Patient not taking: Reported on 2022)       No Known Allergies  Social History     Tobacco Use     Smoking status: Former Smoker     Packs/day: 1.00     Years: 10.00     Pack years: 10.00     Types: Cigarettes     Quit date: 4/15/2021     Years since quittin.1     Smokeless tobacco: Never Used   Substance Use Topics     Alcohol use: No     Comment: Alcoholic Drinks/day: used to be heavy drinker and has stopped     Drug use: No     Patient Active Problem List   Diagnosis     Sciatica     Psoriasis of nail     Idiopathic chronic gout of multiple sites with tophus     Elevated BP without diagnosis of hypertension        Reviewed, reconciled and updated       Physical Exam   General Appearance:       BP (!) 128/100   Pulse 88   Temp 98.5  F (36.9  C) (Oral)   Wt 90.4 kg (199 lb 6.4 oz)   SpO2 97%   BMI 30.77 kg/m      Very pleasant, overweight, note elevated blood  pressure  Thick callus skin medial border right fifth toe     Additional Information   I spent 30 minutes on this encounter, including reviewing interval history since last visit, examining the patient, explaining and counseling the issues enumerated in the Assessment and Plan (patient given a copy), ordering prescriptions       KAYLIE PALMER MD, MD    Answers for HPI/ROS submitted by the patient on 5/25/2022  How many servings of fruits and vegetables do you eat daily?: 0-1  On average, how many sweetened beverages do you drink each day (Examples: soda, juice, sweet tea, etc.  Do NOT count diet or artificially sweetened beverages)?: 1  How many minutes a day do you exercise enough to make your heart beat faster?: 10 to 19  How many days a week do you exercise enough to make your heart beat faster?: 3 or less  How many days per week do you miss taking your medication?: 0  What is the reason for your visit today?: Blood blister keeps forming on toe, may be infected.  When did your symptoms begin?: More than a month  What are your symptoms?: Bloodblister on toe  How would you describe these symptoms?: Mild  Are your symptoms:: Staying the same  Have you had these symptoms before?: No  Is there anything that makes you feel worse?: No  Is there anything that makes you feel better?: No

## 2022-07-24 RX ORDER — INDOMETHACIN 25 MG/1
CAPSULE ORAL
Qty: 120 CAPSULE | Refills: 0 | OUTPATIENT
Start: 2022-07-24

## 2022-07-24 RX ORDER — INDOMETHACIN 50 MG/1
CAPSULE ORAL
Qty: 60 CAPSULE | Refills: 0 | OUTPATIENT
Start: 2022-07-24

## 2022-07-24 NOTE — TELEPHONE ENCOUNTER
indomethacin (INDOCIN) 50 MG capsule (Discontinued)    2021 --   Si cap(s)   Patient not taking: Reported on 2021        Class: Historical   Reason for Discontinue

## 2022-07-26 RX ORDER — INDOMETHACIN 50 MG/1
CAPSULE ORAL
Qty: 60 CAPSULE | Refills: 0 | OUTPATIENT
Start: 2022-07-26

## 2022-07-27 NOTE — TELEPHONE ENCOUNTER
"Medication has been discontinued.    Last Written Prescription Date:  ???  Last Fill Quantity: ???,  # refills: ???   Last office visit provider:  05/27/2022    Requested Prescriptions   Pending Prescriptions Disp Refills     indomethacin (INDOCIN) 50 MG capsule [Pharmacy Med Name: Indomethacin 50 MG Oral Capsule] 60 capsule 0     Sig: TAKE 1 CAPSULE BY MOUTH TWICE DAILY WITH MEALS       Gout Agents Protocol Failed - 7/26/2022  6:37 PM        Failed - Medication is active on med list        Passed - CBC on file in past 12 months     Recent Labs   Lab Test 11/09/21  1152   WBC 7.6   RBC 5.98*   HGB 18.7*   HCT 51.9                    Passed - ALT on file in past 12 months     Recent Labs   Lab Test 11/09/21  1152   ALT 58*             Passed - Has Uric Acid on file in past 12 months and value is less than 6     Recent Labs   Lab Test 11/09/21  1152   URIC 5.8     If level is 6mg/dL or greater, ok to refill one time and refer to provider.           Passed - Recent (12 mo) or future (30 days) visit within the authorizing provider's specialty     Patient has had an office visit with the authorizing provider or a provider within the authorizing providers department within the previous 12 mos or has a future within next 30 days. See \"Patient Info\" tab in inbasket, or \"Choose Columns\" in Meds & Orders section of the refill encounter.              Passed - Patient is age 18 or older        Passed - Normal serum creatinine on file in the past 12 months     Recent Labs   Lab Test 11/09/21  1152   CR 1.15       Ok to refill medication if creatinine is low         NSAID Medications Failed - 7/26/2022  6:37 PM        Failed - Blood pressure under 140/90 in past 12 months     BP Readings from Last 3 Encounters:   05/27/22 (!) 128/100   04/21/22 (!) 140/88   01/28/22 (!) 151/100                 Failed - Normal ALT on file in past 12 months     Recent Labs   Lab Test 11/09/21  1152   ALT 58*             Failed - Normal CBC " "on file in past 12 months     Recent Labs   Lab Test 11/09/21  1152   WBC 7.6   RBC 5.98*   HGB 18.7*   HCT 51.9                    Failed - Medication is active on med list        Passed - Normal AST on file in past 12 months     Recent Labs   Lab Test 11/09/21  1152   AST 32             Passed - Recent (12 mo) or future (30 days) visit within the authorizing provider's specialty     Patient has had an office visit with the authorizing provider or a provider within the authorizing providers department within the previous 12 mos or has a future within next 30 days. See \"Patient Info\" tab in inbasket, or \"Choose Columns\" in Meds & Orders section of the refill encounter.              Passed - Patient is age 6-64 years        Passed - Normal serum creatinine on file in past 12 months     Recent Labs   Lab Test 11/09/21  1152   CR 1.15       Ok to refill medication if creatinine is low               Lilia Loredo 07/26/22 10:14 PM  "

## 2022-08-02 ENCOUNTER — OFFICE VISIT (OUTPATIENT)
Dept: INTERNAL MEDICINE | Facility: CLINIC | Age: 42
End: 2022-08-02
Payer: COMMERCIAL

## 2022-08-02 VITALS
HEART RATE: 110 BPM | SYSTOLIC BLOOD PRESSURE: 122 MMHG | OXYGEN SATURATION: 98 % | TEMPERATURE: 97.8 F | WEIGHT: 198 LBS | HEIGHT: 68 IN | DIASTOLIC BLOOD PRESSURE: 88 MMHG | BODY MASS INDEX: 30.01 KG/M2

## 2022-08-02 DIAGNOSIS — E78.1 HYPERTRIGLYCERIDEMIA: ICD-10-CM

## 2022-08-02 DIAGNOSIS — M1A.09X1 IDIOPATHIC CHRONIC GOUT OF MULTIPLE SITES WITH TOPHUS: ICD-10-CM

## 2022-08-02 DIAGNOSIS — L40.9 PSORIASIS OF NAIL: ICD-10-CM

## 2022-08-02 DIAGNOSIS — Z13.1 SCREENING FOR DIABETES MELLITUS: ICD-10-CM

## 2022-08-02 DIAGNOSIS — I10 ESSENTIAL HYPERTENSION, BENIGN: Primary | ICD-10-CM

## 2022-08-02 PROCEDURE — 99214 OFFICE O/P EST MOD 30 MIN: CPT | Performed by: INTERNAL MEDICINE

## 2022-08-02 RX ORDER — ALLOPURINOL 300 MG/1
1 TABLET ORAL DAILY
Qty: 90 TABLET | Refills: 3 | Status: SHIPPED | OUTPATIENT
Start: 2022-08-02 | End: 2023-08-03

## 2022-08-02 RX ORDER — LOSARTAN POTASSIUM 50 MG/1
50 TABLET ORAL DAILY
Qty: 90 TABLET | Refills: 3 | Status: SHIPPED | OUTPATIENT
Start: 2022-08-02 | End: 2023-08-03

## 2022-08-02 RX ORDER — INDOMETHACIN 50 MG/1
50 CAPSULE ORAL
Qty: 30 CAPSULE | Refills: 1 | Status: SHIPPED | OUTPATIENT
Start: 2022-08-02 | End: 2023-12-28

## 2022-08-02 NOTE — PATIENT INSTRUCTIONS
Follow-up several issues, including hypertension which seems to responding to losartan 50 mg a day started May 27, 2022.    Martell is doing well overall.  He is committed to starting a weight loss project, and would like to pursue that with the hope of reducing his dependence on blood pressure medication.    He is going to come in for fasting blood test on future morning, at which time we will check comprehensive metabolic panel, blood cell counts, A1c test for diabetes, TSH thyroid test, and uric acid levels.    Psoriatic nails, and a few aches and pains, will get a hand x-ray to look for any signs of psoriatic arthritis.    Carpal tunnel syndrome, for which she had neurology consultation over at [facility] June 1, 2022, with a positive EMG.    I told Martell that the treatment for carpal tunnel is to try to reduce the repetitive motion injury, which make it difficult for him, because he works in [employer], and his occupation requires that he has to use his hands.    Neurology Dr. Van did tell him about the possibility of pursuing carpal tunnel release surgery, for which he would see either an orthopedic hand specialist or neurosurgeon who does carpal tunnel release procedures.    42-year-old man, who works in [employer], and is on his feet all day wearing work boots     Essential hypertension, started losartan 50 mg once a day May 27, 2022  His father went on blood pressure medicine in his 30s.  Martell 42 years old, and has had elevated blood pressure especially since he had a bad case of COVID-19 in December 2021.  He is overweight with body mass index of 30.8.    Martell showed me readings from his home blood pressure machine, they seem to be getting better over the past several weeks.  Initially around mid 140s over mid 90s, then down to her best reading of about 120/80.    He is also can work on weight control, reducing sodium in the diet, minimizing or even avoiding alcohol, and getting plenty of physical activity  which she already does.     Obesity with body mass next step 30.8.  He would like to lose 20 pounds this year I think that is a great idea.  I reminded him about the importance of eating slowly, controlling portion size, and identifying problem foods to curtail or eliminate such as starches, sweets, fried foods.  Wt Readings from Last 5 Encounters:   08/02/22 89.8 kg (198 lb)   05/27/22 90.4 kg (199 lb 6.4 oz)   04/21/22 84.8 kg (187 lb)   01/28/22 83.9 kg (185 lb)   01/24/22 83.9 kg (184 lb 14.4 oz)     Heartburn and gastroesophageal reflux.  I suggested he try over-the-counter famotidine (sold under the brand name as Pepcid and Zantac).  If he does not get sufficient relief, escalate to a proton pump inhibitor omeprazole, sold under the brand name Prilosec OTC.     History of gout, maintained on allopurinol 300 mg a day which seems to be working well to keep the gout attacks away    August 2, 2022, he asked for a prescription for indomethacin to keep on hand in case of gout attack.  Prescribed of the 50 mg capsule, take 3 times a day for 5 days, or the minimum number of days needed to relieve acute gout symptoms.     Hypertriglyceridemia, with lipid panel from January 2021 with triglycerides over thousand, low HDL of 27, and a direct measurement of LDL equals 97.    Psoriatic nails, he has been working with a dermatologist, and is considering using Otezla    Slightly elevated ALT liver enzyme of 58 measured in November 2021 and May 2021, probably has a touch of fatty liver from being overweight.  His dermatologist also presented the option of methotrexate for psoriasis.  He would need to have his liver tests monitored closely, for example every 3 months for the first year, if he chooses to start methotrexate because of potential liver side effects from methotrexate    History of COVID-19 infection December 2021, severe illness with bilateral pneumonia.  I told him he needs to get the updated COVID-19 vaccine once  that becomes available later this autumn 2022

## 2022-08-02 NOTE — PROGRESS NOTES
Office Visit - Follow Up   Martell Clement   42 year old male    Date of Visit: 8/2/2022    Chief Complaint   Patient presents with     Follow Up     Blood pressure        -------------------------------------------------------------------------------------------------------------------------  Assessment and Plan    Follow-up several issues, including hypertension which seems to responding to losartan 50 mg a day started May 27, 2022.    Martell is doing well overall.  He is committed to starting a weight loss project, and would like to pursue that with the hope of reducing his dependence on blood pressure medication.    He is going to come in for fasting blood test on future morning, at which time we will check comprehensive metabolic panel, blood cell counts, A1c test for diabetes, TSH thyroid test, and uric acid levels.    Psoriatic nails, and a few aches and pains, will get a hand x-ray to look for any signs of psoriatic arthritis.    Carpal tunnel syndrome, for which she had neurology consultation over at [facility] June 1, 2022, with a positive EMG.    I told Martell that the treatment for carpal tunnel is to try to reduce the repetitive motion injury, which make it difficult for him, because he works in [employer], and his occupation requires that he has to use his hands.    Neurology Dr. Van did tell him about the possibility of pursuing carpal tunnel release surgery, for which he would see either an orthopedic hand specialist or neurosurgeon who does carpal tunnel release procedures.    42-year-old man, who works in [employer], and is on his feet all day wearing work boots     Essential hypertension, started losartan 50 mg once a day May 27, 2022  His father went on blood pressure medicine in his 30s.  Martell 42 years old, and has had elevated blood pressure especially since he had a bad case of COVID-19 in December 2021.  He is overweight with body mass index of 30.8.    Martell showed me readings from his home blood  pressure machine, they seem to be getting better over the past several weeks.  Initially around mid 140s over mid 90s, then down to her best reading of about 120/80.    He is also can work on weight control, reducing sodium in the diet, minimizing or even avoiding alcohol, and getting plenty of physical activity which she already does.     Obesity with body mass next step 30.8.  He would like to lose 20 pounds this year I think that is a great idea.  I reminded him about the importance of eating slowly, controlling portion size, and identifying problem foods to curtail or eliminate such as starches, sweets, fried foods.  Wt Readings from Last 5 Encounters:   08/02/22 89.8 kg (198 lb)   05/27/22 90.4 kg (199 lb 6.4 oz)   04/21/22 84.8 kg (187 lb)   01/28/22 83.9 kg (185 lb)   01/24/22 83.9 kg (184 lb 14.4 oz)     Heartburn and gastroesophageal reflux.  I suggested he try over-the-counter famotidine (sold under the brand name as Pepcid and Zantac).  If he does not get sufficient relief, escalate to a proton pump inhibitor omeprazole, sold under the brand name Prilosec OTC.     History of gout, maintained on allopurinol 300 mg a day which seems to be working well to keep the gout attacks away    August 2, 2022, he asked for a prescription for indomethacin to keep on hand in case of gout attack.  Prescribed of the 50 mg capsule, take 3 times a day for 5 days, or the minimum number of days needed to relieve acute gout symptoms.     Hypertriglyceridemia, with lipid panel from January 2021 with triglycerides over thousand, low HDL of 27, and a direct measurement of LDL equals 97.    Psoriatic nails, he has been working with a dermatologist, and is considering using Otezla    Slightly elevated ALT liver enzyme of 58 measured in November 2021 and May 2021, probably has a touch of fatty liver from being overweight.  His dermatologist also presented the option of methotrexate for psoriasis.  He would need to have his liver  tests monitored closely, for example every 3 months for the first year, if he chooses to start methotrexate because of potential liver side effects from methotrexate    Former Smoker; Quit 4/15/2021; Smoked an average of 1 pack/day for 10 years; Smoked: Cigarettes.    History of COVID-19 infection December 2021, severe illness with bilateral pneumonia.  I told him he needs to get the updated COVID-19 vaccine once that becomes available later this autumn 2022      --------------------------------------------------------------------------------------------------------------------------  History of Present Illness  This 42 year old old     Follow-up several issues, including hypertension which seems to responding to losartan 50 mg a day started May 27, 2022.    Martell is doing well overall.  He is committed to starting a weight loss project, and would like to pursue that with the hope of reducing his dependence on blood pressure medication.    He is going to come in for fasting blood test on future morning, at which time we will check comprehensive metabolic panel, blood cell counts, A1c test for diabetes, TSH thyroid test, and uric acid levels.    Psoriatic nails, and a few aches and pains, will get a hand x-ray to look for any signs of psoriatic arthritis.    Carpal tunnel syndrome, for which she had neurology consultation over at [facility] June 1, 2022, with a positive EMG.    I told Martell that the treatment for carpal tunnel is to try to reduce the repetitive motion injury, which make it difficult for him, because he works in [employer], and his occupation requires that he has to use his hands.    Neurology Dr. Van did tell him about the possibility of pursuing carpal tunnel release surgery, for which he would see either an orthopedic hand specialist or neurosurgeon who does carpal tunnel release procedures.    42-year-old man, who works in [employer], and is on his feet all day wearing work boots     Essential  hypertension, started losartan 50 mg once a day May 27, 2022  His father went on blood pressure medicine in his 30s.  Martell 42 years old, and has had elevated blood pressure especially since he had a bad case of COVID-19 in 2021.  He is overweight with body mass index of 30.8.    Martell showed me readings from his home blood pressure machine, they seem to be getting better over the past several weeks.  Initially around mid 140s over mid 90s, then down to her best reading of about 120/80.    He is also can work on weight control, reducing sodium in the diet, minimizing or even avoiding alcohol, and getting plenty of physical activity which she already does.       Wt Readings from Last 3 Encounters:   22 89.8 kg (198 lb)   22 90.4 kg (199 lb 6.4 oz)   22 84.8 kg (187 lb)     BP Readings from Last 3 Encounters:   22 122/88   22 (!) 128/100   22 (!) 140/88     ---------------------------------------------------------------------------------------------------------------------------    Medications, Allergies, Social, and Problem List   Current Outpatient Medications   Medication Sig Dispense Refill     allopurinol (ZYLOPRIM) 300 MG tablet Take 1 tablet by mouth once daily 90 tablet 3     losartan (COZAAR) 50 MG tablet Take 1 tablet (50 mg) by mouth daily 90 tablet 3     multivitamin w/minerals (THERA-VIT-M) tablet Take 1 tablet by mouth daily (Patient not taking: Reported on 2022)       No Known Allergies  Social History     Tobacco Use     Smoking status: Former Smoker     Packs/day: 1.00     Years: 10.00     Pack years: 10.00     Types: Cigarettes     Quit date: 4/15/2021     Years since quittin.2     Smokeless tobacco: Never Used   Substance Use Topics     Alcohol use: No     Comment: Alcoholic Drinks/day: used to be heavy drinker and has stopped     Drug use: No     Patient Active Problem List   Diagnosis     Sciatica     Psoriasis of nail     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  He appears generally well, has not yet lost weight.    Psoriatic changes across fingernails.  No synovitis evident in his hands.     Additional Information   I spent 30 minutes on this encounter, including reviewing interval history since last visit, examining the patient, explaining and counseling the issues enumerated in the Assessment and Plan (patient given a copy), ordering indicated tests, ordering prescriptions       KAYLIE PALMER MD, MD    "

## 2022-08-05 DIAGNOSIS — M1A.09X1 IDIOPATHIC CHRONIC GOUT OF MULTIPLE SITES WITH TOPHUS: Primary | ICD-10-CM

## 2022-08-06 RX ORDER — COLCHICINE 0.6 MG/1
CAPSULE ORAL
Qty: 60 CAPSULE | Refills: 0 | OUTPATIENT
Start: 2022-08-06

## 2022-08-07 NOTE — TELEPHONE ENCOUNTER
colchicine (COLCYRS) 0.6 MG tablet (Discontinued)   1/9/2022 1/19/2022 --   Sig - Route: Take 0.6 mg by mouth 2 times daily - Oral   Patient not taking: Reported on 1/17/2022

## 2022-08-10 ENCOUNTER — LAB (OUTPATIENT)
Dept: LAB | Facility: CLINIC | Age: 42
End: 2022-08-10
Payer: COMMERCIAL

## 2022-08-10 DIAGNOSIS — E78.1 HYPERTRIGLYCERIDEMIA: ICD-10-CM

## 2022-08-10 DIAGNOSIS — Z13.1 SCREENING FOR DIABETES MELLITUS: ICD-10-CM

## 2022-08-10 DIAGNOSIS — I10 ESSENTIAL HYPERTENSION, BENIGN: ICD-10-CM

## 2022-08-10 DIAGNOSIS — M1A.09X1 IDIOPATHIC CHRONIC GOUT OF MULTIPLE SITES WITH TOPHUS: ICD-10-CM

## 2022-08-10 LAB
ALBUMIN SERPL BCG-MCNC: 4.7 G/DL (ref 3.5–5.2)
ALP SERPL-CCNC: 93 U/L (ref 40–129)
ALT SERPL W P-5'-P-CCNC: 57 U/L (ref 10–50)
ANION GAP SERPL CALCULATED.3IONS-SCNC: 12 MMOL/L (ref 7–15)
AST SERPL W P-5'-P-CCNC: 33 U/L (ref 10–50)
BILIRUB SERPL-MCNC: 0.5 MG/DL
BUN SERPL-MCNC: 17.3 MG/DL (ref 6–20)
CALCIUM SERPL-MCNC: 9.8 MG/DL (ref 8.6–10)
CHLORIDE SERPL-SCNC: 106 MMOL/L (ref 98–107)
CHOLEST SERPL-MCNC: 191 MG/DL
CREAT SERPL-MCNC: 1.15 MG/DL (ref 0.67–1.17)
DEPRECATED HCO3 PLAS-SCNC: 24 MMOL/L (ref 22–29)
ERYTHROCYTE [DISTWIDTH] IN BLOOD BY AUTOMATED COUNT: 12.3 % (ref 10–15)
GFR SERPL CREATININE-BSD FRML MDRD: 83 ML/MIN/1.73M2
GLUCOSE SERPL-MCNC: 100 MG/DL (ref 70–99)
HBA1C MFR BLD: 5.4 % (ref 0–5.6)
HCT VFR BLD AUTO: 47.9 % (ref 40–53)
HDLC SERPL-MCNC: 30 MG/DL
HGB BLD-MCNC: 17.1 G/DL (ref 13.3–17.7)
LDLC SERPL CALC-MCNC: ABNORMAL MG/DL
MCH RBC QN AUTO: 30.4 PG (ref 26.5–33)
MCHC RBC AUTO-ENTMCNC: 35.7 G/DL (ref 31.5–36.5)
MCV RBC AUTO: 85 FL (ref 78–100)
NONHDLC SERPL-MCNC: 161 MG/DL
PLATELET # BLD AUTO: 252 10E3/UL (ref 150–450)
POTASSIUM SERPL-SCNC: 4.9 MMOL/L (ref 3.4–5.3)
PROT SERPL-MCNC: 7 G/DL (ref 6.4–8.3)
RBC # BLD AUTO: 5.63 10E6/UL (ref 4.4–5.9)
SODIUM SERPL-SCNC: 142 MMOL/L (ref 136–145)
TRIGL SERPL-MCNC: 584 MG/DL
TSH SERPL DL<=0.005 MIU/L-ACNC: 1.04 UIU/ML (ref 0.3–4.2)
URATE SERPL-MCNC: 5.7 MG/DL (ref 3.4–7)
WBC # BLD AUTO: 9.4 10E3/UL (ref 4–11)

## 2022-08-10 PROCEDURE — 83036 HEMOGLOBIN GLYCOSYLATED A1C: CPT

## 2022-08-10 PROCEDURE — 36415 COLL VENOUS BLD VENIPUNCTURE: CPT

## 2022-08-10 PROCEDURE — 84443 ASSAY THYROID STIM HORMONE: CPT

## 2022-08-10 PROCEDURE — 80053 COMPREHEN METABOLIC PANEL: CPT

## 2022-08-10 PROCEDURE — 80061 LIPID PANEL: CPT

## 2022-08-10 PROCEDURE — 84550 ASSAY OF BLOOD/URIC ACID: CPT

## 2022-08-10 PROCEDURE — 85027 COMPLETE CBC AUTOMATED: CPT

## 2022-08-30 RX ORDER — COLCHICINE 0.6 MG/1
0.6 TABLET ORAL 2 TIMES DAILY PRN
Qty: 60 TABLET | Refills: 3 | Status: SHIPPED | OUTPATIENT
Start: 2022-08-30 | End: 2023-02-02

## 2022-08-30 NOTE — TELEPHONE ENCOUNTER
Another refill request fax received from pharmacy for this medication. Patient is out and needs more refills.    Silvia WEST CMA ([facility])

## 2022-09-16 ENCOUNTER — IMMUNIZATION (OUTPATIENT)
Dept: NURSING | Facility: CLINIC | Age: 42
End: 2022-09-16
Payer: COMMERCIAL

## 2022-09-16 PROCEDURE — 0134A COVID-19,PF,MODERNA BIVALENT: CPT

## 2022-09-16 PROCEDURE — 91313 COVID-19,PF,MODERNA BIVALENT: CPT

## 2022-10-03 ENCOUNTER — TELEPHONE (OUTPATIENT)
Dept: FAMILY MEDICINE | Facility: CLINIC | Age: 42
End: 2022-10-03

## 2022-10-03 NOTE — TELEPHONE ENCOUNTER
General Call    Contacts       Type Contact Phone/Fax    10/03/2022 01:13 PM CDT Phone (Incoming) Martell Cleemnt (Self) [phone] (H)        Reason for Call:  Prescription Renewal Request    What are your questions or concerns:  Patient was seen by Dr. Barth 21 for Pneumonia Due to Covid-19. Patient was prescribed Home Oxygen Order for DME by the provider.  The [facility] told him that he could keep it for the upcoming flu season since the prescription was sent as lifetime duration but insurance said it needs to be renewed annually, so his prescription is going to  22. So he is requesting that the prescription be renewed because he said that he needs to use it still.   Patient said that the equipment place should be sending Dr. Barth a renewal request before it expires in December.    Date of last appointment with provider: 22  Next appointment: 22    Could we send this information to you in [facility] or would you prefer to receive a phone call?:   Patient would prefer a phone call   Okay to leave a detailed message?: Yes at Cell number on file:    Telephone Information:   Mobile [phone]

## 2022-10-04 NOTE — TELEPHONE ENCOUNTER
Patient informed we would need to check oxygen he states currently oxygen is fine and has been for some time.  He is acting out of fear from when he had covid and thought if he had it on hand he could use if needed.  I assured him that if it was needed again we would order for him and have it once again delivered.  Patient is agreeable with this plan and will call [facility] back and have them .

## 2022-10-04 NOTE — TELEPHONE ENCOUNTER
He needs his O2 levels reevaluated in order to continue the prescription; perhaps he could get a nurse appointment and come in for O2 level at rest and with activity? Or get in next month to see me would be even better.

## 2022-11-19 ENCOUNTER — HEALTH MAINTENANCE LETTER (OUTPATIENT)
Age: 42
End: 2022-11-19

## 2022-12-13 ENCOUNTER — IMMUNIZATION (OUTPATIENT)
Dept: FAMILY MEDICINE | Facility: CLINIC | Age: 42
End: 2022-12-13
Payer: COMMERCIAL

## 2022-12-13 PROCEDURE — 90686 IIV4 VACC NO PRSV 0.5 ML IM: CPT

## 2022-12-13 PROCEDURE — 90471 IMMUNIZATION ADMIN: CPT

## 2022-12-21 ENCOUNTER — OFFICE VISIT (OUTPATIENT)
Dept: ORTHOPEDICS | Facility: CLINIC | Age: 42
End: 2022-12-21
Payer: COMMERCIAL

## 2022-12-21 VITALS — BODY MASS INDEX: 30.31 KG/M2 | HEART RATE: 88 BPM | HEIGHT: 68 IN | WEIGHT: 200 LBS

## 2022-12-21 DIAGNOSIS — G56.03 BILATERAL CARPAL TUNNEL SYNDROME: Primary | ICD-10-CM

## 2022-12-21 PROCEDURE — 99204 OFFICE O/P NEW MOD 45 MIN: CPT | Performed by: PEDIATRICS
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
## 2022-12-21 NOTE — PATIENT INSTRUCTIONS
We discussed focusing on the carpal tunnel syndrome, which appears to be the primary issue currently.  I do not think that there is a significant rheumatologic or inflammatory component to this right now.  For carpal tunnel syndrome, we discussed routine nighttime wrist bracing, with wrist in neutral position.  He may need to adjust the brace that you have, to a more neutral position for the wrist.  Okay to continue with current home exercises.  If interested in referral to hand therapy, contact clinic.  We briefly discussed medication options for this, including oral steroid, gabapentin.  Hold for now.  Also plan to hold injection currently, but could reconsider if symptoms are worsening or changing.  Finally, plan to obtain formal copy of the nerve study results.  Release of information sent off.  Plan to contact you with those results, when known.  If you have not heard anything from the clinic in the next 7 to 10 days, feel free to reach out.      If you have any further questions for your physician or physician s care team you can contact them thru [employer] or by calling  [phone] and use option 3 to leave a voice message.   Messages received during business hours will be returned same day.

## 2022-12-21 NOTE — LETTER
12/21/2022         RE: Martell Clement  [address] [facility] [address]        Dear Colleague,    Thank you for referring your patient, Martell Clement, to the [facility] [place]. Please see a copy of my visit note below.    ASSESSMENT & PLAN    Martell was seen today for numbness and numbness.    Diagnoses and all orders for this visit:    Bilateral carpal tunnel syndrome      Chronic issue.  He has been doing some home exercises. Has also tried bracing, can continue with that. His main concern was potential for connection between psoriasis and carpal tunnel syndrome, and I don't see clear connection currently. So, he prefers to monitor for now. Also see below.  Questions answered. Discussed signs and symptoms that may indicate more serious issues; the patient was instructed to seek appropriate care if noted. Davis indicates understanding of these issues and agrees with the plan.        See Patient Instructions  Patient Instructions   We discussed focusing on the carpal tunnel syndrome, which appears to be the primary issue currently.  I do not think that there is a significant rheumatologic or inflammatory component to this right now.  For carpal tunnel syndrome, we discussed routine nighttime wrist bracing, with wrist in neutral position.  He may need to adjust the brace that you have, to a more neutral position for the wrist.  Okay to continue with current home exercises.  If interested in referral to hand therapy, contact clinic.  We briefly discussed medication options for this, including oral steroid, gabapentin.  Hold for now.  Also plan to hold injection currently, but could reconsider if symptoms are worsening or changing.  Finally, plan to obtain formal copy of the nerve study results.  Release of information sent off.  Plan to contact you with those results, when known.  If you have not heard anything from the clinic in the next 7 to 10 days, feel free to reach  out.      If you have any further questions for your physician or physician s care team you can contact them thru [employer] or by calling  [phone] and use option 3 to leave a voice message.   Messages received during business hours will be returned same day.          Eleazar Neal DO  [facility] [place]    -----  Chief Complaint   Patient presents with     Left Hand - Numbness     Right Hand - Numbness       SUBJECTIVE  Martell Clement is a/an 42 year old male who is seen as a self referral for evaluation of bilateral hand numbness.  Worse at night.  States he has a burning feeling at night.  Feels it in all of his fingers other than his small finger.   R>L  States that he is not here for this condition, but would like to have x rays of his hands and wrists to look for arthritis.  He has psoriasis of his nails and was told to be cautious about any changes in his hands.  X rays were done in August, but he was not aware.       Has had an EMG done and was told he had carpal tunnel syndrom.  Was told to wear a brace.  Feels he is unable to  Use the brace effectively due to his job.     The patient is seen by themselves.  The patient is Right handed    Onset: 6+ months. Reports insidious onset without acute precipitating event.  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    **  Current issue: not really pain, but has numbness in hands.  Diagnosed with carpal tunnel syndrome.  Question if CTS caused by rheum condition (e.g., psoriasis) or not.  Past 1-2 years has been dx'd with HTN, uses medication for that. Has follow-up with primary care in Feb.    Not really that concerned about CTS, is more concerned about potential underlying anti-inflammatory cause that is  "contributing to CTS.  Also has hx gout.    Recalls injury many years ago to distal right small finger, had incident with his brother.      REVIEW OF SYSTEMS:  Review of Systems      OBJECTIVE:  Pulse 88   Ht 1.715 m (5' 7.5\")   Wt 90.7 kg (200 lb)   BMI 30.86 kg/m     General: healthy, alert and in no distress  HEENT: no scleral icterus or conjunctival erythema  Skin: no suspicious lesions or rash. No jaundice.  CV: distal perfusion intact   Resp: normal respiratory effort without conversational dyspnea   Psych: normal mood and affect  Gait: NORMAL  Neuro: Normal tone      Hand/wrist (bilat):    Inspection:  Some nail dystrophy in hands.  No swelling. No ecchymosis.    Motion:  Forearm, wrist, digits grossly intact    Strength:  Grossly intact , finger abduction, pinch    Radial pulses normal, +2/4, capillary refill brisk.    Palpation:  Tender mild right volar wrist    Tinel mild pos. Phalen neg.        RADIOLOGY:  I independently visualized and reviewed these images with the patient  Small ossicle right thumb IP joint. No acute bony abnormality noted. Irregularity right small finger distal phalanx tuft related to remote injury.      EXAM: XR HANDS BILATERAL PA VIEW  LOCATION: [facility]  DATE/TIME: 8/2/2022 4:29 PM     INDICATION: Psoriasis of nail. Pain.  COMPARISON: None.                                                                      IMPRESSION: Normal joint spaces and alignment. No acute fracture. Chronic-appearing bony irregularity at the right fifth distal tuft. Tiny well-corticated ossicle at the radial aspect of the right thumb interphalangeal joint, likely sequelae of prior   trauma. No erosions.                EMG: right sensory latency elevated at 3.1, motor latency elevated at 4.7. Left motor latency normal, sensory latency elevated at 2.7. Muscle testing normal.    Remainder of EMG report not available. Above copied from telephone encounter in Care Everywhere from " Orthopedics, 6/20/22.      Review of external notes as documented elsewhere in note  Review of the result(s) of each unique test - imaging  Independent interpretation of a test performed by another physician/other qualified health care professional (not separately reported) - imaging  30 minutes spent on the date of the encounter doing chart review, history and exam, documentation and further activities per the note           Again, thank you for allowing me to participate in the care of your patient.        Sincerely,        Eleazar Neal, DO

## 2023-02-02 ENCOUNTER — OFFICE VISIT (OUTPATIENT)
Dept: INTERNAL MEDICINE | Facility: CLINIC | Age: 43
End: 2023-02-02
Payer: COMMERCIAL

## 2023-02-02 VITALS
RESPIRATION RATE: 16 BRPM | HEART RATE: 94 BPM | BODY MASS INDEX: 30.01 KG/M2 | OXYGEN SATURATION: 99 % | SYSTOLIC BLOOD PRESSURE: 134 MMHG | WEIGHT: 198 LBS | HEIGHT: 68 IN | DIASTOLIC BLOOD PRESSURE: 74 MMHG | TEMPERATURE: 97.8 F

## 2023-02-02 DIAGNOSIS — L20.84 INTRINSIC ECZEMA: ICD-10-CM

## 2023-02-02 DIAGNOSIS — I10 ESSENTIAL HYPERTENSION, BENIGN: Primary | ICD-10-CM

## 2023-02-02 PROCEDURE — 99213 OFFICE O/P EST LOW 20 MIN: CPT | Performed by: INTERNAL MEDICINE

## 2023-02-02 RX ORDER — TRIAMCINOLONE ACETONIDE 1 MG/G
CREAM TOPICAL 2 TIMES DAILY
Qty: 80 G | Refills: 1 | Status: SHIPPED | OUTPATIENT
Start: 2023-02-02 | End: 2023-05-09

## 2023-02-02 NOTE — PROGRESS NOTES
Office Visit - Follow Up   Martell Clement   42 year old male    Date of Visit: 2/2/2023    Chief Complaint   Patient presents with     Hypertension        -------------------------------------------------------------------------------------------------------------------------  Assessment and Plan    Follow-up several issues  42-year-old man, who works in [employer], and is on his feet all day wearing work boots     Essential hypertension, started losartan 50 mg once a day May 2022  Blood pressure seems to be responding to the losartan.  He brought in some pictures on his smart phone of his home blood pressure machine, which is giving him readings of mid 120s to 130s systolic, and mid 80s diastolic.    I told the target is about 120/80 measured at rest in the seated position.    I did suggest to Davis that he bring his home blood pressure machine to clinic 1 day and have a nurse visit, so that his machine can be compared [id number] against a manual reading, then he can believe the numbers.    Leave him on losartan 50 mg a day for now, although there is another dose level to go up to 100 mg if needed.    Davis will work on losing weight.    BP Readings from Last 6 Encounters:   02/02/23 134/74   08/02/22 122/88   05/27/22 (!) 128/100   04/21/22 (!) 140/88   01/28/22 (!) 151/100   01/24/22 118/84      Patch of what looks like eczema, probably triggered by dry skin, on his left lateral arm, and also on his right leg  These areas itch, which fits better with eczema rather than psoriasis  Lets try some topical triamcinolone 0.1% cream on that.  Also remind him to apply moisturizer to dry skin    Psoriatic nails, and a few aches and pains     Carpal tunnel syndrome, [facility] which [facility] with a positive EMG.     I told Martell that the treatment for carpal tunnel is to try to reduce the repetitive motion injury, which make it difficult for him, because he works in [employer], and  his occupation requires that he has to use his hands.     Neurology Dr. Van did tell him about the possibility of pursuing carpal tunnel release surgery, for which he would see either an orthopedic hand specialist or neurosurgeon who does carpal tunnel release procedures.    Obesity with body mass next step 30.8.  He would like to lose 20 pounds this year I think that is a great idea.  I reminded him about the importance of eating slowly, controlling portion size, and identifying problem foods to curtail or eliminate such as starches, sweets, fried foods.  Wt Readings from Last 5 Encounters:   02/02/23 89.8 kg (198 lb)   12/21/22 90.7 kg (200 lb)   08/02/22 89.8 kg (198 lb)   05/27/22 90.4 kg (199 lb 6.4 oz)   04/21/22 84.8 kg (187 lb)     Heartburn and gastroesophageal reflux.  I suggested he try over-the-counter famotidine (sold under the brand name as Pepcid and Zantac).  If he does not get sufficient relief, escalate to a proton pump inhibitor omeprazole, sold under the brand name Prilosec OTC.     History of gout, maintained on allopurinol 300 mg a day which seems to be working well to keep the gout attacks away     August 2, 2022, he asked for a prescription for indomethacin to keep on hand in case of gout attack.  Prescribed of the 50 mg capsule, take 3 times a day for 5 days, or the minimum number of days needed to relieve acute gout symptoms.     Hypertriglyceridemia, with lipid panel from January 2021 with triglycerides over thousand, low HDL of 27, and a direct measurement of LDL equals 97.  8-  Triglycerides <150 mg/dL 584 High       Direct Measure HDL >=40 mg/dL 30 Low      Non HDL Cholesterol <130 mg/dL 161 High       Hemoglobin A1C 0.0 - 5.6 % 5.4      Psoriatic nails, he has been working with a dermatologist, and is considering using Otezla     Slightly elevated ALT liver enzyme of 58 measured in November 2021 and May 2021, probably has a touch of fatty liver from being overweight.    His  dermatologist also presented the option of methotrexate for psoriasis.  He would need to have his liver tests monitored closely, for example every 3 months for the first year, if he chooses to start methotrexate because of potential liver side effects from methotrexate    8-  ALT 10 - 50 U/L 57 High       Former Smoker; Quit 4/15/2021; Smoked an average of 1 pack/day for 10 years; Smoked: Cigarettes.     History of COVID-19 infection December 2021, severe illness with bilateral pneumonia.  COVID-19 Vaccine Bivalent Booster 18+ (Moderna) 09/16/2022       --------------------------------------------------------------------------------------------------------------------------  History of Present Illness  This 42 year old old     Follow-up several issues  42-year-old man, who works in [employer], and is on his feet all day wearing work boots     Essential hypertension, started losartan 50 mg once a day May 2022  Blood pressure seems to be responding to the losartan.  He brought in some pictures on his smart phone of his home blood pressure machine, which is giving him readings of mid 120s to 130s systolic, and mid 80s diastolic.    I told the target is about 120/80 measured at rest in the seated position.    I did suggest to Davis that he bring his home blood pressure machine to clinic 1 day and have a nurse visit, so that his machine can be compared [id number] against a manual reading, then he can believe the numbers.    Leave him on losartan 50 mg a day for now, although there is another dose level to go up to 100 mg if needed.    Davis will work on losing weight.      Wt Readings from Last 3 Encounters:   02/02/23 89.8 kg (198 lb)   12/21/22 90.7 kg (200 lb)   08/02/22 89.8 kg (198 lb)     BP Readings from Last 3 Encounters:   02/02/23 134/74   08/02/22 122/88   05/27/22 (!) 128/100       Lab Results   Component Value Date    WBC 9.4 08/10/2022    HGB 17.1 08/10/2022    HCT 47.9 08/10/2022     08/10/2022     "CHOL 191 08/10/2022    TRIG 584 (H) 08/10/2022    HDL 30 (L) 08/10/2022    ALT 57 (H) 08/10/2022    AST 33 08/10/2022     08/10/2022    BUN 17.3 08/10/2022    CO2 24 08/10/2022    TSH 1.04 08/10/2022        Review of Systems: A comprehensive review of systems was negative except as noted.  ---------------------------------------------------------------------------------------------------------------------------    Medications, Allergies, Social, and Problem List   Current Outpatient Medications   Medication Sig Dispense Refill     allopurinol (ZYLOPRIM) 300 MG tablet Take 1 tablet (300 mg) by mouth daily 90 tablet 3     indomethacin (INDOCIN) 50 MG capsule Take 1 capsule (50 mg) by mouth 3 times daily (with meals) Take 5 days in case of gout attack, or the minimum duration to relieve symptoms. 30 capsule 1     losartan (COZAAR) 50 MG tablet Take 1 tablet (50 mg) by mouth daily 90 tablet 3     colchicine (COLCYRS) 0.6 MG tablet Take 1 tablet (0.6 mg) by mouth 2 times daily as needed for moderate pain (Patient not taking: Reported on 2023) 60 tablet 3     No Known Allergies  Social History     Tobacco Use     Smoking status: Former     Packs/day: 1.00     Years: 10.00     Pack years: 10.00     Types: Cigarettes     Quit date: 4/15/2021     Years since quittin.8     Smokeless tobacco: Never   Substance Use Topics     Alcohol use: No     Comment: Alcoholic Drinks/day: used to be heavy drinker and has stopped     Drug use: No     Patient Active Problem List   Diagnosis     Sciatica     Psoriasis of nail     Idiopathic chronic gout of multiple sites with tophus     Elevated BP without diagnosis of hypertension        Reviewed, reconciled and updated       Physical Exam   General Appearance:       /74 (BP Location: Right arm, Patient Position: Sitting, Cuff Size: Adult Regular)   Pulse 94   Temp 97.8  F (36.6  C)   Resp 16   Ht 1.715 m (5' 7.5\")   Wt 89.8 kg (198 lb)   SpO2 99%   BMI 30.55 kg/m  "     Patch of what looks like eczema, probably triggered by dry skin, on his left lateral arm, and also on his right leg     Additional Information   I spent 20 minutes on this encounter, including reviewing interval history since last visit, examining the patient, explaining and counseling the issues enumerated in the Assessment and Plan (patient given a copy), ordering prescriptions       KAYLIE PALMER MD, MD

## 2023-02-02 NOTE — PATIENT INSTRUCTIONS
Follow-up several issues  42-year-old man, who works in [employer], and is on his feet all day wearing work boots     Essential hypertension, started losartan 50 mg once a day May 2022  Blood pressure seems to be responding to the losartan.  He brought in some pictures on his smart phone of his home blood pressure machine, which is giving him readings of mid 120s to 130s systolic, and mid 80s diastolic.    I told the target is about 120/80 measured at rest in the seated position.    I did suggest to Davis that he bring his home blood pressure machine to clinic 1 day and have a nurse visit, so that his machine can be compared [id number] against a manual reading, then he can believe the numbers.    Leave him on losartan 50 mg a day for now, although there is another dose level to go up to 100 mg if needed.    Davis will work on losing weight.    BP Readings from Last 6 Encounters:   02/02/23 134/74   08/02/22 122/88   05/27/22 (!) 128/100   04/21/22 (!) 140/88   01/28/22 (!) 151/100   01/24/22 118/84      Patch of what looks like eczema, probably triggered by dry skin, on his left lateral arm, and also on his right leg  These areas itch, which fits better with eczema rather than psoriasis  Lets try some topical triamcinolone 0.1% cream on that.  Also remind him to apply moisturizer to dry skin    Psoriatic nails, and a few aches and pains     Carpal tunnel syndrome, for which she had neurology consultation over at [facility] June 1, 2022, with a positive EMG.     I told Martell that the treatment for carpal tunnel is to try to reduce the repetitive motion injury, which make it difficult for him, because he works in [employer], and his occupation requires that he has to use his hands.     Neurology Dr. Van did tell him about the possibility of pursuing carpal tunnel release surgery, for which he would see either an orthopedic hand specialist or neurosurgeon who does carpal tunnel release procedures.    Obesity with body mass next  step 30.8.  He would like to lose 20 pounds this year I think that is a great idea.  I reminded him about the importance of eating slowly, controlling portion size, and identifying problem foods to curtail or eliminate such as starches, sweets, fried foods.  Wt Readings from Last 5 Encounters:   02/02/23 89.8 kg (198 lb)   12/21/22 90.7 kg (200 lb)   08/02/22 89.8 kg (198 lb)   05/27/22 90.4 kg (199 lb 6.4 oz)   04/21/22 84.8 kg (187 lb)     Heartburn and gastroesophageal reflux.  I suggested he try over-the-counter famotidine (sold under the brand name as Pepcid and Zantac).  If he does not get sufficient relief, escalate to a proton pump inhibitor omeprazole, sold under the brand name Prilosec OTC.     History of gout, maintained on allopurinol 300 mg a day which seems to be working well to keep the gout attacks away     August 2, 2022, he asked for a prescription for indomethacin to keep on hand in case of gout attack.  Prescribed of the 50 mg capsule, take 3 times a day for 5 days, or the minimum number of days needed to relieve acute gout symptoms.     Hypertriglyceridemia, with lipid panel from January 2021 with triglycerides over thousand, low HDL of 27, and a direct measurement of LDL equals 97.  8-  Triglycerides <150 mg/dL 584 High       Direct Measure HDL >=40 mg/dL 30 Low      Non HDL Cholesterol <130 mg/dL 161 High       Hemoglobin A1C 0.0 - 5.6 % 5.4      Psoriatic nails, he has been working with a dermatologist, and is considering using Otezla     Slightly elevated ALT liver enzyme of 58 measured in November 2021 and May 2021, probably has a touch of fatty liver from being overweight.    His dermatologist also presented the option of methotrexate for psoriasis.  He would need to have his liver tests monitored closely, for example every 3 months for the first year, if he chooses to start methotrexate because of potential liver side effects from methotrexate    8-  ALT 10 - 50 U/L 57 High        Former Smoker; Quit 4/15/2021; Smoked an average of 1 pack/day for 10 years; Smoked: Cigarettes.     History of COVID-19 infection December 2021, severe illness with bilateral pneumonia.  COVID-19 Vaccine Bivalent Booster 18+ (Moderna) 09/16/2022

## 2023-02-21 ENCOUNTER — NURSE TRIAGE (OUTPATIENT)
Dept: INTERNAL MEDICINE | Facility: CLINIC | Age: 43
End: 2023-02-21
Payer: COMMERCIAL

## 2023-02-21 DIAGNOSIS — U07.1 INFECTION DUE TO 2019 NOVEL CORONAVIRUS: Primary | ICD-10-CM

## 2023-02-21 NOTE — TELEPHONE ENCOUNTER
Paxlovid prescription sent to his [facility] pharmacy  Risk factor of body mass index greater than 30

## 2023-02-21 NOTE — TELEPHONE ENCOUNTER
RN COVID TREATMENT VISIT  02/21/23      The patient has been triaged and does not require a higher level of care.    Martell Clement  42 year old  Current weight? 198    Has the patient been seen by a primary care provider at an [facility] or [email or web address] within the past two years? Yes.   Have you been in close proximity to/do you have a known exposure to a person with a confirmed case of influenza? No.     General treatment eligibility:  Date of positive COVID test (PCR or at home)?  2/21/23    Are you or have you been hospitalized for this COVID-19 infection? No.   Have you received monoclonal antibodies or antiviral treatment for COVID-19 since this positive test? No.   Do you have any of the following conditions that place you at risk of being very sick from COVID-19?   - Current or former smoker  Yes, patient has at least one high risk condition as noted above.     Current COVID symptoms:   - fever or chills  - fatigue  - muscle or body aches  - sore throat  - congestion or runny nose  Yes. Patient has at least one symptom as selected.     How many days since symptoms started? 5 days or less. Established patient, 12 years or older weighing at least 88.2 lbs, who has symptoms that started in the past 5 days, has not been hospitalized nor received treatment already, and is at risk for being very sick from COVID-19.     Treatment eligibility by RN:    Are you currently pregnant or nursing? No    Do you have a clinically significant hypersensitivity to nirmatrelvir or ritonavir, or toxic epidermal necrolysis (TEN) or Cai-Gary Syndrome? No    Do you have a history of hepatitis, any hepatic impairment on the Problem List (such as Child-Howard Class C, cirrhosis, fatty liver disease, alcoholic liver disease), or was the last liver lab (hepatic panel, ALT, AST, ALK Phos, bilirubin) elevated in the past 6 months? YES    Do you have any history of severe renal impairment (eGFR < 30mL/min)?  No    Is patient eligible to continue? No, patient does not meet all eligibility requirements for the RN COVID treatment (as denoted by yes response(s) above). Patient informed they will need a virtual provider visit to assess treatment options.  Patient will be transferred to a  at the end of this call.   Tammi Condon RN

## 2023-02-21 NOTE — TELEPHONE ENCOUNTER
Patient stated that at last OV, pcp instructed him to call if he ever tested positive for COVID.   Patient has history of COVID-19 infection December 2021, severe illness with bilateral pneumonia and is concerned this could happen again so is interested in treatment if appropriate.He is a former smoker.     Patient had positive home test 2/21/23. Symptom onset of body aches, moderate nasal congestion, fatigue, fever and moderate sore throat. Temperature at time of call 100.2F, highest is last 24hr 102.2F. O2 saturation on RA 95%- has been ranging 95-99%. Denied cough, shortness of breath, wheezing, chest pain, headache, chest pain.     Discussed with patient disposition and that I would forward to pcp to review.     Tammi Condon RN      Reason for Disposition    [1] HIGH RISK patient AND [2] influenza is widespread in the community AND [3] ONE OR MORE respiratory symptoms: cough, sore throat, runny or stuffy nose    Additional Information    Negative: SEVERE difficulty breathing (e.g., struggling for each breath, speaks in single words)    Negative: Difficult to awaken or acting confused (e.g., disoriented, slurred speech)    Negative: Bluish (or gray) lips or face now    Negative: Shock suspected (e.g., cold/pale/clammy skin, too weak to stand, low BP, rapid pulse)    Negative: Sounds like a life-threatening emergency to the triager    Negative: [1] Diagnosed or suspected COVID-19 AND [2] symptoms lasting 3 or more weeks    Negative: [1] COVID-19 exposure AND [2] no symptoms    Negative: COVID-19 vaccine reaction suspected (e.g., fever, headache, muscle aches) occurring 1 to 3 days after getting vaccine    Negative: COVID-19 vaccine, questions about    Negative: [1] Lives with someone known to have influenza (flu test positive) AND [2] flu-like symptoms (e.g., cough, runny nose, sore throat, SOB; with or without fever)    Negative: [1] Adult with possible COVID-19 symptoms AND [2] triager concerned about severity  of symptoms or other causes    Negative: COVID-19 and breastfeeding, questions about    Negative: SEVERE or constant chest pain or pressure  (Exception: Mild central chest pain, present only when coughing.)    Negative: MODERATE difficulty breathing (e.g., speaks in phrases, SOB even at rest, pulse 100-120)    Negative: Headache and stiff neck (can't touch chin to chest)    Negative: Oxygen level (e.g., pulse oximetry) 90 percent or lower    Negative: Chest pain or pressure  (Exception: MILD central chest pain, present only when coughing)    Negative: Patient sounds very sick or weak to the triager    Protocols used: CORONAVIRUS (COVID-19) DIAGNOSED OR [id number]

## 2023-02-27 ENCOUNTER — OFFICE VISIT (OUTPATIENT)
Dept: FAMILY MEDICINE | Facility: CLINIC | Age: 43
End: 2023-02-27
Payer: COMMERCIAL

## 2023-02-27 VITALS
WEIGHT: 200 LBS | OXYGEN SATURATION: 98 % | DIASTOLIC BLOOD PRESSURE: 87 MMHG | HEART RATE: 80 BPM | RESPIRATION RATE: 16 BRPM | SYSTOLIC BLOOD PRESSURE: 125 MMHG | TEMPERATURE: 97.5 F | BODY MASS INDEX: 30.86 KG/M2

## 2023-02-27 DIAGNOSIS — U07.1 INFECTION DUE TO 2019 NOVEL CORONAVIRUS: Primary | ICD-10-CM

## 2023-02-27 PROCEDURE — 99213 OFFICE O/P EST LOW 20 MIN: CPT | Mod: CS | Performed by: NURSE PRACTITIONER

## 2023-02-27 ASSESSMENT — ENCOUNTER SYMPTOMS
DIARRHEA: 0
WEAKNESS: 0
NAUSEA: 0
DIZZINESS: 0
FREQUENCY: 0
ARTHRALGIAS: 0
FEVER: 0
PALPITATIONS: 0
HEMATURIA: 0
HEADACHES: 0
SHORTNESS OF BREATH: 0
JOINT SWELLING: 0
MYALGIAS: 0
DYSURIA: 0
PARESTHESIAS: 0
COUGH: 0
SORE THROAT: 0
HEMATOCHEZIA: 0
HEARTBURN: 0
EYE PAIN: 0
CHILLS: 0
CONSTIPATION: 0
NERVOUS/ANXIOUS: 0
ABDOMINAL PAIN: 0

## 2023-02-27 ASSESSMENT — PAIN SCALES - GENERAL: PAINLEVEL: NO PAIN (0)

## 2023-02-27 NOTE — PATIENT INSTRUCTIONS
Continue with isolation as long as symptom are present  Symptom management with adequate hydration, may take ibuprofen/tylenol for fever and discomfort  Follow-up with emergency care if chest pain or SOB occur  Follow-up with primary care if symptoms persist or do not improve

## 2023-04-09 ENCOUNTER — HEALTH MAINTENANCE LETTER (OUTPATIENT)
Age: 43
End: 2023-04-09

## 2023-05-09 ENCOUNTER — OFFICE VISIT (OUTPATIENT)
Dept: CARDIOLOGY | Facility: CLINIC | Age: 43
End: 2023-05-09
Payer: COMMERCIAL

## 2023-05-09 VITALS
HEIGHT: 68 IN | DIASTOLIC BLOOD PRESSURE: 94 MMHG | OXYGEN SATURATION: 98 % | SYSTOLIC BLOOD PRESSURE: 132 MMHG | BODY MASS INDEX: 30.95 KG/M2 | WEIGHT: 204.2 LBS | HEART RATE: 91 BPM

## 2023-05-09 DIAGNOSIS — E66.09 CLASS 1 OBESITY DUE TO EXCESS CALORIES WITHOUT SERIOUS COMORBIDITY WITH BODY MASS INDEX (BMI) OF 31.0 TO 31.9 IN ADULT: ICD-10-CM

## 2023-05-09 DIAGNOSIS — R07.2 PRECORDIAL PAIN: ICD-10-CM

## 2023-05-09 DIAGNOSIS — L20.84 INTRINSIC ECZEMA: ICD-10-CM

## 2023-05-09 DIAGNOSIS — E78.2 MIXED HYPERLIPIDEMIA: ICD-10-CM

## 2023-05-09 DIAGNOSIS — Z13.6 SCREENING FOR CARDIOVASCULAR CONDITION: Primary | ICD-10-CM

## 2023-05-09 DIAGNOSIS — E78.6 HDL DEFICIENCY: ICD-10-CM

## 2023-05-09 DIAGNOSIS — E66.811 CLASS 1 OBESITY DUE TO EXCESS CALORIES WITHOUT SERIOUS COMORBIDITY WITH BODY MASS INDEX (BMI) OF 31.0 TO 31.9 IN ADULT: ICD-10-CM

## 2023-05-09 PROCEDURE — 99204 OFFICE O/P NEW MOD 45 MIN: CPT | Performed by: INTERNAL MEDICINE

## 2023-05-09 PROCEDURE — 93000 ELECTROCARDIOGRAM COMPLETE: CPT | Performed by: INTERNAL MEDICINE

## 2023-05-09 RX ORDER — TRIAMCINOLONE ACETONIDE 1 MG/G
CREAM TOPICAL 2 TIMES DAILY PRN
COMMUNITY
Start: 2023-05-09

## 2023-05-09 NOTE — PROGRESS NOTES
HPI and Plan:   Davis is a very nice 43-year-old gentleman who comes in self-referred for concerns about primary prevention for coronary artery disease.    His father had heart problems starting in his 40s ultimately received a stent is still alive at 67.  He was a  and a 3 pack a day smoker.    Davis works in [employer].  He states he gets some exercise.  He quit smoking after having a serious COVID infection 2 years ago prior to that 1/2 to 1 pack of cigarettes per day.  Review of his labs shows mixed hyperlipidemia with marked hypertriglyceridemia and HDL deficiency.  He does have hypertension ever since COVID for which she is on losartan and has had some problems with hyperkalemia.    He has a left-sided chest pain that occurs intermittently.  It is not closely related to physical activity.  He has no dyspnea on exertion orthopnea or PND no palpitations lightheadedness dizziness syncope or near syncope.  He has no routine exercise regiment.    Review of the chart shows a normal sinus rhythm and a normal EKG.  Most recent fasting lipid profile from last summer shows a total cholesterol of 191 with an HDL of 30 and triglycerides of 584.  His triglycerides a year before were over thousand.  He states he used to drink significant amounts of alcohol but has almost cut it out to the point that he only drinks twice a month he does eat a lot of carbohydrates.  Review of the chart also shows hyperkalemia at x5.1 and 4.9.    Assessment and plan.  #1 Davis has no symptoms at this time to suggest ischemia.  I think his chest pain is musculoskeletal.  Nonetheless I recommended we do a stress echocardiogram as he is a high risk individual and we should look for silent ischemia.  Obviously if that is abnormal we will talk about coronary angiography.  If that is negative I would then do a calcium score to see what his risk is.  When he comes in I have asked him to come in fasting paying special attention to watch the  carbohydrates in his diet to recheck a fasting lipid profile.    We talked about the importance of not smoking, regular daily exercise, predominantly plant-based diet and losing weight.  Talked about the Mediterranean diet.  States he is already cut back on red meat.  I told him the goal should be as close to a vegetarian diet as he can get.    Blood pressure is well controlled on his losartan.  I would consider changing losartan given his propensity for hyperkalemia.  I would consider spironolactone.  I would also consider referral to an endocrinologist for evaluation of a hyper adrenal state.    Regarding his hypertriglyceridemia.  He appears to have normal glucoses and a normal TSH.  He does not have peripheral edema I do not think he has nephrotic syndrome.  He may just have metabolic syndrome.    Further evaluation treatment will depend upon the above results.Thank you for allow me to participate in this patient's care.  Sincerely,                               Vicente Parra MD [facility]              Today's clinic visit entailed:  Review of the result(s) of each unique test - EKG, lab work  The following tests were independently interpreted by me as noted in my documentation: EKG  Ordering of each unique test  Prescription drug management  50 minutes spent by me on the date of the encounter doing chart review, history and exam, documentation and further activities per the note  Provider  Link to MDM Help Grid     The level of medical decision making during this visit was of moderate complexity.      Orders Placed This Encounter   Procedures     Lipid Profile     ALT     Follow-Up with Cardiology     EKG 12-lead complete w/read - Clinics (performed today)     Exercise Stress Echocardiogram       Orders Placed This Encounter   Medications     Ginger, Zingiber officinalis, (GINGER PO)     Sig: Take 1 capsule by mouth daily as needed     CINNAMON PO     Sig: Take 1 capsule by mouth daily as needed     Ascorbic Acid  (VITAMIN C PO)     Sig: Take 1 capsule by mouth daily as needed     TURMERIC CURCUMIN PO     Sig: Take 1 capsule by mouth daily as needed     Vitamin D-Vitamin K (VITAMIN K2-VITAMIN D3 PO)     Sig: Take 1 capsule by mouth daily as needed     triamcinolone (KENALOG) 0.1 % external cream     Sig: Apply topically 2 times daily as needed       Medications Discontinued During This Encounter   Medication Reason     triamcinolone (KENALOG) 0.1 % external cream          Encounter Diagnoses   Name Primary?     Screening for cardiovascular condition Yes     Mixed hyperlipidemia      Intrinsic eczema      Precordial pain      HDL deficiency      Class 1 obesity due to excess calories without serious comorbidity with body mass index (BMI) of 31.0 to 31.9 in adult        CURRENT MEDICATIONS:  Current Outpatient Medications   Medication Sig Dispense Refill     allopurinol (ZYLOPRIM) 300 MG tablet Take 1 tablet (300 mg) by mouth daily 90 tablet 3     Ascorbic Acid (VITAMIN C PO) Take 1 capsule by mouth daily as needed       CINNAMON PO Take 1 capsule by mouth daily as needed       Ginger, Zingiber officinalis, (GINGER PO) Take 1 capsule by mouth daily as needed       indomethacin (INDOCIN) 50 MG capsule Take 1 capsule (50 mg) by mouth 3 times daily (with meals) Take 5 days in case of gout attack, or the minimum duration to relieve symptoms. (Patient taking differently: Take 50 mg by mouth 3 times daily as needed Take 5 days in case of gout attack, or the minimum duration to relieve symptoms.) 30 capsule 1     losartan (COZAAR) 50 MG tablet Take 1 tablet (50 mg) by mouth daily 90 tablet 3     triamcinolone (KENALOG) 0.1 % external cream Apply topically 2 times daily as needed       TURMERIC CURCUMIN PO Take 1 capsule by mouth daily as needed       Vitamin D-Vitamin K (VITAMIN K2-VITAMIN D3 PO) Take 1 capsule by mouth daily as needed         ALLERGIES   No Known Allergies    PAST MEDICAL HISTORY:  Past Medical History:   Diagnosis  "Date     Gout      Idiopathic chronic gout of multiple sites with tophus 2022       PAST SURGICAL HISTORY:  Past Surgical History:   Procedure Laterality Date     HERNIA REPAIR Right     repaired twice       FAMILY HISTORY:  Family History   Problem Relation Age of Onset     Diabetes Father      Heart Disease Father      Coronary Artery Disease Father      Hypertension Father      Diabetes Paternal Grandmother      Diabetes Paternal Grandfather      Heart Disease Paternal Grandfather      Diabetes Paternal Uncle      Heart Disease Paternal Uncle      Cancer Maternal Grandfather         Bone cancer       SOCIAL HISTORY:  Social History     Socioeconomic History     Marital status: Patient Declined     Spouse name: None     Number of children: None     Years of education: None     Highest education level: None   Tobacco Use     Smoking status: Former     Packs/day: 1.00     Years: 10.00     Pack years: 10.00     Types: Cigarettes     Quit date: 4/15/2021     Years since quittin.0     Smokeless tobacco: Never   Substance and Sexual Activity     Alcohol use: No     Comment: Alcoholic Drinks/day: used to be heavy drinker and has stopped     Drug use: No     Sexual activity: Not Currently     Partners: Female   Other Topics Concern     Parent/sibling w/ CABG, MI or angioplasty before 65F 55M? Yes       Review of Systems:  Skin:  Positive for rash Eczema   Eyes:  Negative      ENT:  Negative      Respiratory:  Negative       Cardiovascular:    Positive for;palpitations Occasional palpitations  Gastroenterology: Negative      Genitourinary:  Negative      Musculoskeletal:  Negative      Neurologic:  Positive for numbness or tingling of hands    Psychiatric:  Negative      Heme/Lymph/Imm:  Negative      Endocrine:  Negative        Physical Exam:  Vitals: BP (!) 132/94 (BP Location: Right arm, Patient Position: Sitting, Cuff Size: Adult Large)   Pulse 91   Ht 1.715 m (5' 7.5\")   Wt 92.6 kg (204 lb 3.2 oz)   SpO2 " 98%   BMI 31.51 kg/m      Constitutional:  cooperative, alert and oriented, well developed, well nourished, in no acute distress obese      Skin:  warm and dry to the touch, no apparent skin lesions or masses noted          Head:  normocephalic, no masses or lesions        Eyes:  pupils equal and round, conjunctivae and lids unremarkable, sclera white, no xanthalasma, EOMS intact, no nystagmus        Lymph:      ENT:  no pallor or cyanosis, dentition good        Neck:  no carotid bruit        Respiratory:  normal breath sounds, clear to auscultation, normal A-P diameter, normal symmetry, normal respiratory excursion, no use of accessory muscles         Cardiac: regular rhythm;no murmurs, gallops or rubs detected                pulses full and equal                                        GI:           Extremities and Muscular Skeletal:  no edema;no spinal abnormalities noted;normal muscle strength and tone              Neurological:  no gross motor deficits        Psych:  affect appropriate, oriented to time, person and place        CC  No referring provider defined for this encounter.

## 2023-05-09 NOTE — LETTER
5/9/2023    KAYLIE PALMER MD  [address] [place] [address]    RE: Martell Clement       Dear Colleague,     I had the pleasure of seeing Martell Clement in the [facility].  HPI and Plan:   Davis is a very nice 43-year-old gentleman who comes in self-referred for concerns about primary prevention for coronary artery disease.    His father had heart problems starting in his 40s ultimately received a stent is still alive at 67.  He was a  and a 3 pack a day smoker.    Davis works in [employer].  He states he gets some exercise.  He quit smoking after having a serious COVID infection 2 years ago prior to that 1/2 to 1 pack of cigarettes per day.  Review of his labs shows mixed hyperlipidemia with marked hypertriglyceridemia and HDL deficiency.  He does have hypertension ever since COVID for which she is on losartan and has had some problems with hyperkalemia.    He has a left-sided chest pain that occurs intermittently.  It is not closely related to physical activity.  He has no dyspnea on exertion orthopnea or PND no palpitations lightheadedness dizziness syncope or near syncope.  He has no routine exercise regiment.    Review of the chart shows a normal sinus rhythm and a normal EKG.  Most recent fasting lipid profile from last summer shows a total cholesterol of 191 with an HDL of 30 and triglycerides of 584.  His triglycerides a year before were over thousand.  He states he used to drink significant amounts of alcohol but has almost cut it out to the point that he only drinks twice a month he does eat a lot of carbohydrates.  Review of the chart also shows hyperkalemia at x5.1 and 4.9.    Assessment and plan.  #1 Davis has no symptoms at this time to suggest ischemia.  I think his chest pain is musculoskeletal.  Nonetheless I recommended we do a stress echocardiogram as he is a high risk individual and we should look for silent ischemia.  Obviously if that is abnormal we will talk about  coronary angiography.  If that is negative I would then do a calcium score to see what his risk is.  When he comes in I have asked him to come in fasting paying special attention to watch the carbohydrates in his diet to recheck a fasting lipid profile.    We talked about the importance of not smoking, regular daily exercise, predominantly plant-based diet and losing weight.  Talked about the Mediterranean diet.  States he is already cut back on red meat.  I told him the goal should be as close to a vegetarian diet as he can get.    Blood pressure is well controlled on his losartan.  I would consider changing losartan given his propensity for hyperkalemia.  I would consider spironolactone.  I would also consider referral to an endocrinologist for evaluation of a hyper adrenal state.    Regarding his hypertriglyceridemia.  He appears to have normal glucoses and a normal TSH.  He does not have peripheral edema I do not think he has nephrotic syndrome.  He may just have metabolic syndrome.    Further evaluation treatment will depend upon the above results.Thank you for allow me to participate in this patient's care.  Sincerely,                               Vicente Parra MD [facility]              Today's clinic visit entailed:  Review of the result(s) of each unique test - EKG, lab work  The following tests were independently interpreted by me as noted in my documentation: EKG  Ordering of each unique test  Prescription drug management  50 minutes spent by me on the date of the encounter doing chart review, history and exam, documentation and further activities per the note  Provider  Link to [facility] Help Grid     The level of medical decision making during this visit was of moderate complexity.      Orders Placed This Encounter   Procedures    Lipid Profile    ALT    Follow-Up with Cardiology    EKG 12-lead complete w/read - Clinics (performed today)    Exercise Stress Echocardiogram       Orders Placed This Encounter    Medications    Ginger, Zingiber officinalis, (GINGER PO)     Sig: Take 1 capsule by mouth daily as needed    CINNAMON PO     Sig: Take 1 capsule by mouth daily as needed    Ascorbic Acid (VITAMIN C PO)     Sig: Take 1 capsule by mouth daily as needed    TURMERIC CURCUMIN PO     Sig: Take 1 capsule by mouth daily as needed    Vitamin D-Vitamin K (VITAMIN K2-VITAMIN D3 PO)     Sig: Take 1 capsule by mouth daily as needed    triamcinolone (KENALOG) 0.1 % external cream     Sig: Apply topically 2 times daily as needed       Medications Discontinued During This Encounter   Medication Reason    triamcinolone (KENALOG) 0.1 % external cream          Encounter Diagnoses   Name Primary?    Screening for cardiovascular condition Yes    Mixed hyperlipidemia     Intrinsic eczema     Precordial pain     HDL deficiency     Class 1 obesity due to excess calories without serious comorbidity with body mass index (BMI) of 31.0 to 31.9 in adult        CURRENT MEDICATIONS:  Current Outpatient Medications   Medication Sig Dispense Refill    allopurinol (ZYLOPRIM) 300 MG tablet Take 1 tablet (300 mg) by mouth daily 90 tablet 3    Ascorbic Acid (VITAMIN C PO) Take 1 capsule by mouth daily as needed      CINNAMON PO Take 1 capsule by mouth daily as needed      Ginger, Zingiber officinalis, (GINGER PO) Take 1 capsule by mouth daily as needed      indomethacin (INDOCIN) 50 MG capsule Take 1 capsule (50 mg) by mouth 3 times daily (with meals) Take 5 days in case of gout attack, or the minimum duration to relieve symptoms. (Patient taking differently: Take 50 mg by mouth 3 times daily as needed Take 5 days in case of gout attack, or the minimum duration to relieve symptoms.) 30 capsule 1    losartan (COZAAR) 50 MG tablet Take 1 tablet (50 mg) by mouth daily 90 tablet 3    triamcinolone (KENALOG) 0.1 % external cream Apply topically 2 times daily as needed      TURMERIC CURCUMIN PO Take 1 capsule by mouth daily as needed      Vitamin D-Vitamin  K (VITAMIN K2-VITAMIN D3 PO) Take 1 capsule by mouth daily as needed         ALLERGIES   No Known Allergies    PAST MEDICAL HISTORY:  Past Medical History:   Diagnosis Date    Gout     Idiopathic chronic gout of multiple sites with tophus 2022       PAST SURGICAL HISTORY:  Past Surgical History:   Procedure Laterality Date    HERNIA REPAIR Right     repaired twice       FAMILY HISTORY:  Family History   Problem Relation Age of Onset    Diabetes Father     Heart Disease Father     Coronary Artery Disease Father     Hypertension Father     Diabetes Paternal Grandmother     Diabetes Paternal Grandfather     Heart Disease Paternal Grandfather     Diabetes Paternal Uncle     Heart Disease Paternal Uncle     Cancer Maternal Grandfather         Bone cancer       SOCIAL HISTORY:  Social History     Socioeconomic History    Marital status: Patient Declined     Spouse name: None    Number of children: None    Years of education: None    Highest education level: None   Tobacco Use    Smoking status: Former     Packs/day: 1.00     Years: 10.00     Pack years: 10.00     Types: Cigarettes     Quit date: 4/15/2021     Years since quittin.0    Smokeless tobacco: Never   Substance and Sexual Activity    Alcohol use: No     Comment: Alcoholic Drinks/day: used to be heavy drinker and has stopped    Drug use: No    Sexual activity: Not Currently     Partners: Female   Other Topics Concern    Parent/sibling w/ CABG, MI or angioplasty before 65F 55M? Yes       Review of Systems:  Skin:  Positive for rash Eczema   Eyes:  Negative      ENT:  Negative      Respiratory:  Negative       Cardiovascular:    Positive for;palpitations Occasional palpitations  Gastroenterology: Negative      Genitourinary:  Negative      Musculoskeletal:  Negative      Neurologic:  Positive for numbness or tingling of hands    Psychiatric:  Negative      Heme/Lymph/Imm:  Negative      Endocrine:  Negative        Physical Exam:  Vitals: BP (!) 132/94 (BP  "Location: Right arm, Patient Position: Sitting, Cuff Size: Adult Large)   Pulse 91   Ht 1.715 m (5' 7.5\")   Wt 92.6 kg (204 lb 3.2 oz)   SpO2 98%   BMI 31.51 kg/m      Constitutional:  cooperative, alert and oriented, well developed, well nourished, in no acute distress obese      Skin:  warm and dry to the touch, no apparent skin lesions or masses noted          Head:  normocephalic, no masses or lesions        Eyes:  pupils equal and round, conjunctivae and lids unremarkable, sclera white, no xanthalasma, EOMS intact, no nystagmus        Lymph:      ENT:  no pallor or cyanosis, dentition good        Neck:  no carotid bruit        Respiratory:  normal breath sounds, clear to auscultation, normal A-P diameter, normal symmetry, normal respiratory excursion, no use of accessory muscles         Cardiac: regular rhythm;no murmurs, gallops or rubs detected                pulses full and equal                                        GI:           Extremities and Muscular Skeletal:  no edema;no spinal abnormalities noted;normal muscle strength and tone              Neurological:  no gross motor deficits        Psych:  affect appropriate, oriented to time, person and place        CC  No referring provider defined for this encounter.      Thank you for allowing me to participate in the care of your patient.      Sincerely,     Vicente Parra MD     [facility]  "

## 2023-08-03 DIAGNOSIS — I10 ESSENTIAL HYPERTENSION, BENIGN: ICD-10-CM

## 2023-08-03 DIAGNOSIS — M1A.09X1 IDIOPATHIC CHRONIC GOUT OF MULTIPLE SITES WITH TOPHUS: ICD-10-CM

## 2023-08-03 RX ORDER — LOSARTAN POTASSIUM 50 MG/1
50 TABLET ORAL DAILY
Qty: 90 TABLET | Refills: 3 | Status: SHIPPED | OUTPATIENT
Start: 2023-08-03 | End: 2024-08-06

## 2023-08-03 RX ORDER — ALLOPURINOL 300 MG/1
1 TABLET ORAL DAILY
Qty: 90 TABLET | Refills: 3 | Status: SHIPPED | OUTPATIENT
Start: 2023-08-03

## 2023-08-03 NOTE — TELEPHONE ENCOUNTER
"Routing refill request to provider for review/approval because:  Labs out of range:  ALT  Blood pressure failed    Last Written Prescription Date:  8/2/2022  Last Fill Quantity: 90,  # refills: 3   Last office visit provider:  2/2/2023     Requested Prescriptions   Pending Prescriptions Disp Refills    losartan (COZAAR) 50 MG tablet [Pharmacy Med Name: Losartan Potassium 50 MG Oral Tablet] 90 tablet 0     Sig: Take 1 tablet by mouth once daily       Angiotensin-II Receptors Failed - 8/3/2023  3:21 PM        Failed - Last blood pressure under 140/90 in past 12 months     BP Readings from Last 3 Encounters:   05/09/23 (!) 132/94   02/27/23 125/87   02/02/23 134/74                 Passed - Recent (12 mo) or future (30 days) visit within the authorizing provider's specialty     Patient has had an office visit with the authorizing provider or a provider within the authorizing providers department within the previous 12 mos or has a future within next 30 days. See \"Patient Info\" tab in inbasket, or \"Choose Columns\" in Meds & Orders section of the refill encounter.              Passed - Medication is active on med list        Passed - Patient is age 18 or older        Passed - Normal serum creatinine on file in past 12 months     Recent Labs   Lab Test 08/10/22  0847   CR 1.15       Ok to refill medication if creatinine is low          Passed - Normal serum potassium on file in past 12 months     Recent Labs   Lab Test 08/10/22  0847   POTASSIUM 4.9                  Last Written Prescription Date:  8/2/2022  Last Fill Quantity: 90,  # refills: 3   Last office visit provider:  2/2/2023       allopurinol (ZYLOPRIM) 300 MG tablet [Pharmacy Med Name: Allopurinol 300 MG Oral Tablet] 90 tablet 0     Sig: Take 1 tablet by mouth once daily       Gout Agents Protocol Passed - 8/3/2023  3:21 PM        Passed - CBC on file in past 12 months     Recent Labs   Lab Test 08/10/22  0847   WBC 9.4   RBC 5.63   HGB 17.1   HCT 47.9    " "                Passed - ALT on file in past 12 months     Recent Labs   Lab Test 08/10/22  0847   ALT 57*             Passed - Has Uric Acid on file in past 12 months and value is less than 6     Recent Labs   Lab Test 08/10/22  0847   URIC 5.7     If level is 6mg/dL or greater, ok to refill one time and refer to provider.           Passed - Recent (12 mo) or future (30 days) visit within the authorizing provider's specialty     Patient has had an office visit with the authorizing provider or a provider within the authorizing providers department within the previous 12 mos or has a future within next 30 days. See \"Patient Info\" tab in inbasket, or \"Choose Columns\" in Meds & Orders section of the refill encounter.              Passed - Medication is active on med list        Passed - Patient is age 18 or older        Passed - Normal serum creatinine on file in the past 12 months     Recent Labs   Lab Test 08/10/22  0847   CR 1.15       Ok to refill medication if creatinine is low               Lorelei Rust RN 08/03/23 3:43 PM  "

## 2023-10-25 ENCOUNTER — MYC MEDICAL ADVICE (OUTPATIENT)
Dept: INTERNAL MEDICINE | Facility: CLINIC | Age: 43
End: 2023-10-25
Payer: COMMERCIAL

## 2023-10-25 DIAGNOSIS — L40.50 PSORIATIC ARTHRITIS (H): Primary | ICD-10-CM

## 2023-10-26 NOTE — TELEPHONE ENCOUNTER
Harriet Torres~    Order sent to [facility].. [facility] Dr Antionette Soriano is not part of the [facility] [place] [facility]\"" Network.    I will reach out to pt/Davis to let him know.    Thank you,  Dolores SKINNER [facility] Referral Coordinator

## 2023-12-22 ENCOUNTER — TRANSFERRED RECORDS (OUTPATIENT)
Dept: HEALTH INFORMATION MANAGEMENT | Facility: CLINIC | Age: 43
End: 2023-12-22
Payer: COMMERCIAL

## 2023-12-28 DIAGNOSIS — M1A.09X1 IDIOPATHIC CHRONIC GOUT OF MULTIPLE SITES WITH TOPHUS: ICD-10-CM

## 2023-12-28 RX ORDER — INDOMETHACIN 50 MG/1
50 CAPSULE ORAL 3 TIMES DAILY PRN
Qty: 30 CAPSULE | Refills: 1 | Status: SHIPPED | OUTPATIENT
Start: 2023-12-28 | End: 2023-12-29

## 2023-12-28 NOTE — TELEPHONE ENCOUNTER
New Medication Request    Contacts         Type Contact Phone/Fax    12/28/2023 12:47 PM CST Phone (Incoming) Davis Clement (Self) [phone] (M)            What medication are you requesting?: indomethacin (INDOCIN) 50 MG capsule     Reason for medication request: Patient requesting 25 mg capsules as the 50 mg cap are hard on patients stomache    Have you taken this medication before?: Yes: same medication, just requesting smaller mg cap    Controlled Substance Agreement on file:   CSA -- Patient Level:    CSA: None found at the patient level.         Patient offered an appointment? No    Preferred Pharmacy:   [facility] [address] [place], [place] - [address]  [address]  [place] [address]  Phone: [phone] Fax: [phone]          Could we send this information to you in [facility] or would you prefer to receive a phone call?:   Patient would prefer a phone call   Okay to leave a detailed message?: Yes at Cell number on file:    Telephone Information:   Mobile [phone]

## 2023-12-29 DIAGNOSIS — M1A.09X1 IDIOPATHIC CHRONIC GOUT OF MULTIPLE SITES WITH TOPHUS: ICD-10-CM

## 2023-12-29 RX ORDER — INDOMETHACIN 50 MG/1
50 CAPSULE ORAL 3 TIMES DAILY PRN
Qty: 30 CAPSULE | Refills: 1 | Status: SHIPPED | OUTPATIENT
Start: 2023-12-29

## 2024-06-16 ENCOUNTER — HEALTH MAINTENANCE LETTER (OUTPATIENT)
Age: 44
End: 2024-06-16

## 2024-08-06 ENCOUNTER — MYC REFILL (OUTPATIENT)
Dept: INTERNAL MEDICINE | Facility: CLINIC | Age: 44
End: 2024-08-06
Payer: COMMERCIAL

## 2024-08-06 DIAGNOSIS — I10 ESSENTIAL HYPERTENSION, BENIGN: ICD-10-CM

## 2024-08-06 RX ORDER — LOSARTAN POTASSIUM 50 MG/1
50 TABLET ORAL DAILY
Qty: 90 TABLET | Refills: 3 | Status: SHIPPED | OUTPATIENT
Start: 2024-08-06

## 2024-10-25 DIAGNOSIS — M1A.09X1 IDIOPATHIC CHRONIC GOUT OF MULTIPLE SITES WITH TOPHUS: ICD-10-CM

## 2024-10-28 RX ORDER — ALLOPURINOL 300 MG/1
1 TABLET ORAL DAILY
Qty: 90 TABLET | Refills: 3 | Status: SHIPPED | OUTPATIENT
Start: 2024-10-28

## 2025-03-14 ENCOUNTER — MYC REFILL (OUTPATIENT)
Dept: INTERNAL MEDICINE | Facility: CLINIC | Age: 45
End: 2025-03-14
Payer: COMMERCIAL

## 2025-03-14 DIAGNOSIS — M1A.09X1 IDIOPATHIC CHRONIC GOUT OF MULTIPLE SITES WITH TOPHUS: ICD-10-CM

## 2025-03-17 RX ORDER — INDOMETHACIN 50 MG/1
50 CAPSULE ORAL 3 TIMES DAILY PRN
Qty: 30 CAPSULE | Refills: 1 | Status: SHIPPED | OUTPATIENT
Start: 2025-03-17

## 2025-07-02 DIAGNOSIS — I10 ESSENTIAL HYPERTENSION, BENIGN: ICD-10-CM

## 2025-07-02 RX ORDER — LOSARTAN POTASSIUM 50 MG/1
50 TABLET ORAL DAILY
Qty: 90 TABLET | Refills: 3 | Status: SHIPPED | OUTPATIENT
Start: 2025-07-02